# Patient Record
Sex: FEMALE | Race: WHITE | NOT HISPANIC OR LATINO | Employment: OTHER | URBAN - METROPOLITAN AREA
[De-identification: names, ages, dates, MRNs, and addresses within clinical notes are randomized per-mention and may not be internally consistent; named-entity substitution may affect disease eponyms.]

---

## 2019-05-07 DIAGNOSIS — R10.13 DYSPEPSIA: Primary | ICD-10-CM

## 2019-05-07 RX ORDER — OMEPRAZOLE 40 MG/1
40 CAPSULE, DELAYED RELEASE ORAL DAILY
Qty: 90 CAPSULE | Refills: 3 | Status: SHIPPED | OUTPATIENT
Start: 2019-05-07 | End: 2020-07-06

## 2019-07-26 ENCOUNTER — TELEPHONE (OUTPATIENT)
Dept: GASTROENTEROLOGY | Facility: CLINIC | Age: 74
End: 2019-07-26

## 2019-07-26 NOTE — TELEPHONE ENCOUNTER
I contacted [facility], they have refills on file  Patient called and it was too early to fill at that time  They will process Rx now

## 2019-07-26 NOTE — TELEPHONE ENCOUNTER
Patient was here with her  and needs refills of Omeprazole 40 mg   One daily #90  to [facility] [phone]

## 2020-02-07 ENCOUNTER — IOP CHECK (OUTPATIENT)
Dept: URBAN - METROPOLITAN AREA CLINIC 79 | Facility: CLINIC | Age: 75
End: 2020-02-07

## 2020-02-07 DIAGNOSIS — H40.1131: ICD-10-CM

## 2020-02-07 PROCEDURE — 92012 INTRM OPH EXAM EST PATIENT: CPT

## 2020-02-07 PROCEDURE — 92083 EXTENDED VISUAL FIELD XM: CPT

## 2020-02-07 ASSESSMENT — TONOMETRY
OD_IOP_MMHG: 12
OS_IOP_MMHG: 12

## 2020-02-07 ASSESSMENT — VISUAL ACUITY
OS_CC: 20/25+2
OD_CC: 20/30-2

## 2020-06-26 ENCOUNTER — TELEPHONE (OUTPATIENT)
Dept: GASTROENTEROLOGY | Facility: CLINIC | Age: 75
End: 2020-06-26

## 2020-06-29 ENCOUNTER — IOP CHECK (OUTPATIENT)
Dept: URBAN - METROPOLITAN AREA CLINIC 79 | Facility: CLINIC | Age: 75
End: 2020-06-29

## 2020-06-29 DIAGNOSIS — H40.1131: ICD-10-CM

## 2020-06-29 PROCEDURE — 92012 INTRM OPH EXAM EST PATIENT: CPT

## 2020-06-29 PROCEDURE — 92133 CPTRZD OPH DX IMG PST SGM ON: CPT

## 2020-06-29 ASSESSMENT — TONOMETRY
OD_IOP_MMHG: 13
OS_IOP_MMHG: 12

## 2020-06-29 ASSESSMENT — VISUAL ACUITY
OD_PH: 20/30-1
OS_CC: 20/30+2
OD_CC: 20/60-1

## 2020-07-06 DIAGNOSIS — R10.13 DYSPEPSIA: ICD-10-CM

## 2020-07-06 RX ORDER — OMEPRAZOLE 40 MG/1
CAPSULE, DELAYED RELEASE ORAL
Qty: 90 CAPSULE | Refills: 3 | Status: SHIPPED | OUTPATIENT
Start: 2020-07-06 | End: 2021-07-02

## 2020-07-27 ENCOUNTER — TELEPHONE (OUTPATIENT)
Dept: GASTROENTEROLOGY | Facility: CLINIC | Age: 75
End: 2020-07-27

## 2020-07-27 NOTE — TELEPHONE ENCOUNTER
I notified patient that this Rx was filled 7/6/20 to [facility] and receipt confirmed  She is scheduled for follow up in August 2020

## 2020-08-11 ENCOUNTER — CLINICAL SUPPORT (OUTPATIENT)
Dept: GASTROENTEROLOGY | Facility: CLINIC | Age: 75
End: 2020-08-11

## 2020-08-11 VITALS — BODY MASS INDEX: 26.63 KG/M2 | WEIGHT: 156 LBS | HEIGHT: 64 IN

## 2020-08-11 DIAGNOSIS — Z87.19 HISTORY OF BARRETT'S ESOPHAGUS: Primary | ICD-10-CM

## 2020-08-11 RX ORDER — LISINOPRIL 2.5 MG/1
2.5 TABLET ORAL 2 TIMES DAILY
COMMUNITY

## 2020-08-18 ENCOUNTER — HOSPITAL ENCOUNTER (OUTPATIENT)
Dept: GASTROENTEROLOGY | Facility: AMBULATORY SURGERY CENTER | Age: 75
Discharge: HOME | End: 2020-08-18
Payer: MEDICARE

## 2020-08-18 ENCOUNTER — ANESTHESIA EVENT (OUTPATIENT)
Dept: GASTROENTEROLOGY | Facility: AMBULATORY SURGERY CENTER | Age: 75
End: 2020-08-18

## 2020-08-18 ENCOUNTER — ANESTHESIA (OUTPATIENT)
Dept: GASTROENTEROLOGY | Facility: AMBULATORY SURGERY CENTER | Age: 75
End: 2020-08-18

## 2020-08-18 VITALS
HEART RATE: 62 BPM | SYSTOLIC BLOOD PRESSURE: 162 MMHG | RESPIRATION RATE: 23 BRPM | TEMPERATURE: 97.3 F | OXYGEN SATURATION: 98 % | DIASTOLIC BLOOD PRESSURE: 78 MMHG

## 2020-08-18 DIAGNOSIS — K22.70 BARRETT'S ESOPHAGUS WITHOUT DYSPLASIA: ICD-10-CM

## 2020-08-18 PROBLEM — K21.9 GASTROESOPHAGEAL REFLUX DISEASE: Status: ACTIVE | Noted: 2020-08-18

## 2020-08-18 PROBLEM — I10 ESSENTIAL HYPERTENSION: Status: ACTIVE | Noted: 2020-08-18

## 2020-08-18 PROBLEM — C50.919 BREAST CANCER (HCC): Status: ACTIVE | Noted: 2020-08-18

## 2020-08-18 PROCEDURE — 43239 EGD BIOPSY SINGLE/MULTIPLE: CPT | Performed by: INTERNAL MEDICINE

## 2020-08-18 PROCEDURE — 88305 TISSUE EXAM BY PATHOLOGIST: CPT | Performed by: PATHOLOGY

## 2020-08-18 RX ORDER — SODIUM CHLORIDE 9 MG/ML
INJECTION, SOLUTION INTRAVENOUS CONTINUOUS PRN
Status: DISCONTINUED | OUTPATIENT
Start: 2020-08-18 | End: 2020-08-18

## 2020-08-18 RX ORDER — PROPOFOL 10 MG/ML
INJECTION, EMULSION INTRAVENOUS AS NEEDED
Status: DISCONTINUED | OUTPATIENT
Start: 2020-08-18 | End: 2020-08-18

## 2020-08-18 RX ORDER — LIDOCAINE HYDROCHLORIDE 10 MG/ML
INJECTION, SOLUTION EPIDURAL; INFILTRATION; INTRACAUDAL; PERINEURAL AS NEEDED
Status: DISCONTINUED | OUTPATIENT
Start: 2020-08-18 | End: 2020-08-18

## 2020-08-18 RX ORDER — SODIUM CHLORIDE 9 MG/ML
50 INJECTION, SOLUTION INTRAVENOUS ONCE
Status: COMPLETED | OUTPATIENT
Start: 2020-08-18 | End: 2020-08-18

## 2020-08-18 RX ADMIN — SODIUM CHLORIDE 50 ML/HR: 9 INJECTION, SOLUTION INTRAVENOUS at 08:26

## 2020-08-18 RX ADMIN — PROPOFOL 20 MG: 10 INJECTION, EMULSION INTRAVENOUS at 08:56

## 2020-08-18 RX ADMIN — SODIUM CHLORIDE: 9 INJECTION, SOLUTION INTRAVENOUS at 08:46

## 2020-08-18 RX ADMIN — PROPOFOL 20 MG: 10 INJECTION, EMULSION INTRAVENOUS at 08:54

## 2020-08-18 RX ADMIN — PROPOFOL 10 MG: 10 INJECTION, EMULSION INTRAVENOUS at 08:55

## 2020-08-18 RX ADMIN — PROPOFOL 20 MG: 10 INJECTION, EMULSION INTRAVENOUS at 08:52

## 2020-08-18 RX ADMIN — LIDOCAINE HYDROCHLORIDE 40 MG: 10 INJECTION, SOLUTION EPIDURAL; INFILTRATION; INTRACAUDAL; PERINEURAL at 08:49

## 2020-08-18 RX ADMIN — PROPOFOL 30 MG: 10 INJECTION, EMULSION INTRAVENOUS at 08:49

## 2020-08-18 RX ADMIN — PROPOFOL 20 MG: 10 INJECTION, EMULSION INTRAVENOUS at 08:50

## 2020-08-18 RX ADMIN — PROPOFOL 10 MG: 10 INJECTION, EMULSION INTRAVENOUS at 08:53

## 2020-08-18 NOTE — ANESTHESIA PREPROCEDURE EVALUATION
Procedure:  EGD    Relevant Problems   CARDIO   (+) Essential hypertension      GI/HEPATIC   (+) Gastroesophageal reflux disease      GYN   (+) Breast cancer (HCC)        Physical Exam    Airway    Mallampati score: II  TM Distance: >3 FB  Neck ROM: full     Dental   No notable dental hx     Cardiovascular  Rhythm: regular, Rate: normal, Cardiovascular exam normal    Pulmonary  Pulmonary exam normal Breath sounds clear to auscultation,     Other Findings        Anesthesia Plan  ASA Score- 2     Anesthesia Type- IV sedation with anesthesia with ASA Monitors  Additional Monitors:   Airway Plan:           Plan Factors-Exercise tolerance (METS): >4 METS  Chart reviewed  Patient is not a current smoker  Patient instructed to abstain from smoking on day of procedure  Patient did not smoke on day of surgery  Obstructive sleep apnea risk education given perioperatively  Induction- intravenous  Postoperative Plan-     Informed Consent- Anesthetic plan and risks discussed with patient

## 2020-08-18 NOTE — ANESTHESIA POSTPROCEDURE EVALUATION
Post-Op Assessment Note    CV Status:  Stable  Pain Score: 0    Pain management: adequate     Mental Status:  Sleepy   Hydration Status:  Euvolemic   PONV Controlled:  Controlled   Airway Patency:  Patent      Post Op Vitals Reviewed: Yes      Staff: Anesthesiologist         No complications documented      BP      Temp     Pulse     Resp      SpO2

## 2020-08-18 NOTE — H&P
History and Physical -  [facility]  Kassidy Badillo 66 y o  female MRN: [id number]    HPI: Kassidy Badillo is a 66y o  year old female who presents for upper endoscopy  She has history of Hernandez's esophagus and multiple gastric polyps benign    REVIEW OF SYSTEMS: Per the HPI, and otherwise unremarkable  Historical Information   Past Medical History:   Diagnosis Date    Hernandez esophagus     Cancer ([address] )     Breast ca    Colon polyp     GERD (gastroesophageal reflux disease)     Hypertension      Past Surgical History:   Procedure Laterality Date    APPENDECTOMY      BREAST SURGERY      COLONOSCOPY      HERNIA REPAIR      umbilical and inguinal x2    UPPER GASTROINTESTINAL ENDOSCOPY       Social History   Social History     Substance and Sexual Activity   Alcohol Use Yes    Frequency: 2-4 times a month     Social History     Substance and Sexual Activity   Drug Use Not on file     Social History     Tobacco Use   Smoking Status Former Smoker   Smokeless Tobacco Never Used     Family History   Problem Relation Age of Onset    Colon cancer Mother     Colon polyps Neg Hx        Meds/Allergies       Current Outpatient Medications:     lisinopril (ZESTRIL) 2 5 mg tablet    omeprazole (PriLOSEC) 40 MG capsule  No current facility-administered medications for this encounter  Allergies   Allergen Reactions    Adhesive [Medical Tape] Hives    Bactrim [Sulfamethoxazole-Trimethoprim] Hives    Cefadroxil Diarrhea    Flagyl [Metronidazole] Hives       Objective     /73   Pulse 69   Temp (!) 97 3 °F (36 3 °C) (Temporal)   Resp 12   SpO2 97%     PHYSICAL EXAM    Gen: NAD AAOx3  CV: S1S2 RRR no m/r/g  CHEST: Clear b/l no c/r/w  ABD: soft, +BS NT/ND  EXT: no edema    ASSESSMENT/PLAN:  This is a 66y o  year old female here for Hernandez's esophagus and she is stable and optimized for her procedure

## 2020-08-18 NOTE — DISCHARGE INSTRUCTIONS
Hernandez Esophagus   WHAT YOU NEED TO KNOW:   What is Hernandez esophagus? Hernandez esophagus is a condition in which the cells that line your esophagus are damaged  The damage can cause abnormal changes in the cells  These abnormal changes increase your risk of esophageal cancer  What increases my risk for Hernandez esophagus? The exact cause of Hernandez esophagus is not known  The following may increase your risk:  · Long-term gastroesophageal reflux disease (GERD), a condition that causes stomach acid to back up into the esophagus    · Age older than 48    · Family history of GERD, Hernandez esophagus, or esophageal cancer    · Obesity    · Smoking  What are the signs and symptoms of Hernandez esophagus? Signs and symptoms of Hernandez esophagus are usually related to the signs and symptoms of GERD  You may also have any of the following:  · Heartburn    · Difficult or painful swallowing    · Bitter or acid taste in your mouth    · Cough    · Frequent burping or hiccups    · Vomiting blood or having black, tarry bowel movements    · Weight loss without trying  How is Hernandez esophagus diagnosed? · An endoscopy  is a procedure in which a scope is used to see the inside of your esophagus and stomach  A scope is a long, bendable tube with a light on the end of it  A camera may be hooked to the scope  A biopsy may also be done  A biopsy is when your healthcare provider takes tissue samples from your esophagus and sends it to a lab for tests  These tests will show if the cells of your esophagus have dysplasia (abnormal changes in your cells and tissues)  · The grade  of dysplasia is the amount of abnormal change that is present in your esophagus  The grade can range from negative (no dysplasia) to high-grade (a large amount of dysplasia)  There is a higher risk of cancer with high-grade dysplasia  How is Hernandez esophagus treated? Treatment depends on the grade of dysplasia   The goal of treatment is to control your symptoms and prevent esophageal cancer  Your healthcare provider may also suggest that you make changes in the foods you eat and in your lifestyle  You may need any of the following:  · Anti-reflux medicines  help decrease the stomach acid that can irritate your esophagus and stomach  These medicines may include proton pump inhibitors (PPI) and histamine type-2 receptor (H2) blockers  You may also be given medicines to stop vomiting  · Surgery or other procedures  may be done if you have high-grade dysplasia  ¨ Fundoplication  is a surgery that wraps the upper part of your stomach around the esophageal sphincter to strengthen it  The esophageal sphincter is the muscle at the lower end of the esophagus, right above the stomach  This may help to prevent reflux  ¨ Resection or esophagectomy  is surgery to remove a part of or the entire esophagus  ¨ Ablation  is procedure that kills abnormal cells with heat or by freezing them  ¨ Photodynamic therapy  is a procedure that uses a special type of light to kill abnormal cells  It is used in combination with medicines that make the abnormal cells sensitive to light  When should I contact my healthcare provider? · Your symptoms do not improve with treatment  · You have questions or concerns about your condition or care  When should I seek immediate care or call 911? · You have severe chest pain and shortness of breath  · Your bowel movements are black, bloody, or tarry  · Your vomit looks like coffee grounds or has blood in it  CARE AGREEMENT:   You have the right to help plan your care  Learn about your health condition and how it may be treated  Discuss treatment options with your caregivers to decide what care you want to receive  You always have the right to refuse treatment  The above information is an  only  It is not intended as medical advice for individual conditions or treatments   Talk to your doctor, nurse or pharmacist before following any medical regimen to see if it is safe and effective for you  © 2017 [employer] Information is for End User's use only and may not be sold, redistributed or otherwise used for commercial purposes  All illustrations and images included in CareNotes® are the copyrighted property of [employer]  or Kahlil Aguirre  Hiatal Hernia   WHAT YOU NEED TO KNOW:   What is a hiatal hernia? A hiatal hernia is a condition that causes part of your stomach to bulge through the hiatus (small opening) in your diaphragm  The part of the stomach may move up and down, or it may get trapped above the diaphragm  What increases my risk for a hiatal hernia? The exact cause of a hiatal hernia is not known  You may have been born with a large hiatus  The following may increase your risk of a hiatal hernia:  · Obesity    · Older age    · Medical conditions such as diverticulosis or esophagitis    · Previous surgery of the esophagus or stomach or trauma such as from a motor vehicle accident  What are the types of hiatal hernia? · Type I (sliding hiatal hernia): A portion of the stomach slides in and out of the hiatus  This type is the most common and usually causes gastroesophageal reflux disease (GERD)  GERD occurs when the esophageal sphincter does not close properly and causes acid reflux  The esophageal sphincter is the lower muscle of the esophagus  · Type II (paraesophageal hiatal hernia):  Type II hiatal hernia forms when a part of the stomach squeezes through the hiatus and lies next to the esophagus  · Type III (combined):  Type III hiatal hernia is a combination of a sliding and a paraesophageal hiatal hernia  · Type IV (complex paraesophageal hiatal hernia): The whole stomach, the small and large bowels, spleen, pancreas, or liver is pushed up into the chest   What are the signs and symptoms of a hiatal hernia? The most common symptom is heartburn   This usually occurs after meals and spreads to your neck, jaw, or shoulder  You may have no signs or symptoms, or you may have any of the following:  · Abdominal pain, especially in the area just above your navel    · Bitter or acid taste in your mouth    · Trouble swallowing    · Coughing or hoarseness    · Chest pain or shortness of breath that occurs after eating    · Frequent burping or hiccups    · Uncomfortable feeling of fullness after eating  How is a hiatal hernia diagnosed? · An upper GI series test  includes x-rays of your esophagus, stomach, and your small intestines  It is also called a barium swallow test  You will be given barium (a chalky liquid) to drink before the pictures are taken  This liquid helps your stomach and intestines show up better on the x-rays  An upper GI series can show if you have an ulcer, a blocked intestine, or other problems  · An endoscopy  uses a scope to see the inside of your digestive tract  A scope is a long, bendable tube with a light on the end of it  A camera may be hooked to the scope to take pictures  How is a hiatal hernia treated? Treatment depends on the type of hiatal hernia you have and on your symptoms  You may not need any treatment  You may need any of the following:  · Medicines  may be given to relieve heartburn symptoms  These medicines help to decrease or block stomach acid  You may also be given medicines that help to tighten the esophageal sphincter  · Surgery  may be done when medicines cannot control your symptoms, or other problems are present  Your healthcare provider may also suggest surgery depending on the type of hernia you have  Your healthcare provider can put your stomach back into its normal location  He may make the hiatus (hole) smaller and anchor your stomach in your abdomen  Fundoplication is a surgery that wraps the upper part of the stomach around the esophageal sphincter to strengthen it  How can I manage symptoms?   The following nutrition and lifestyle changes may be recommended to relieve symptoms of heartburn  · Avoid foods that make your symptoms worse  These may include spicy foods, fruit juices, alcohol, caffeine, chocolate, and mint  · Eat several small meals during the day  Small meals give your stomach less food to digest     · Avoid lying down and bending forward after you eat  Do not eat meals 2 to 3 hours before bedtime  This decreases your risk for reflux  · Maintain a healthy weight  If you are overweight, weight loss may help relieve your symptoms  · Sleep with your head elevated  at least 6 inches  · Do not smoke  Smoking can increase your symptoms of heartburn  When should I seek immediate care? · You have severe abdominal pain  · You try to vomit but nothing comes out (retching)  · You have severe chest pain and sudden trouble breathing  · Your bowel movements are black or bloody  · Your vomit looks like coffee grounds or has blood in it  When should I contact my healthcare provider? · Your symptoms are getting worse  · You have nausea, and you are vomiting  · You are losing weight without trying  · You have questions or concerns about your condition or care  CARE AGREEMENT:   You have the right to help plan your care  Learn about your health condition and how it may be treated  Discuss treatment options with your caregivers to decide what care you want to receive  You always have the right to refuse treatment  The above information is an  only  It is not intended as medical advice for individual conditions or treatments  Talk to your doctor, nurse or pharmacist before following any medical regimen to see if it is safe and effective for you  © 2017 [address] Information is for End User's use only and may not be sold, redistributed or otherwise used for commercial purposes   All illustrations and images included in CareNotes® are the copyrighted property of Amrik BENAVIDES  or Kahlil Aguirre

## 2020-08-27 NOTE — RESULT ENCOUNTER NOTE
I called the patient and reviewed path - lugo's no dysplasia -  Gastric polyps are benign - no h pylori- recall egd in 3 years - office visit in 1 year - copy Dr Jenn Smith for path

## 2020-10-19 ENCOUNTER — ESTABLISHED COMPREHENSIVE EXAM (OUTPATIENT)
Dept: URBAN - METROPOLITAN AREA CLINIC 79 | Facility: CLINIC | Age: 75
End: 2020-10-19

## 2020-10-19 VITALS — HEIGHT: 55 IN

## 2020-10-19 DIAGNOSIS — H40.1131: ICD-10-CM

## 2020-10-19 PROCEDURE — 92014 COMPRE OPH EXAM EST PT 1/>: CPT

## 2020-10-19 PROCEDURE — 92250 FUNDUS PHOTOGRAPHY W/I&R: CPT

## 2020-10-19 ASSESSMENT — VISUAL ACUITY
OS_CC: 20/25-1
OD_CC: 20/30
OS_CC: J1
OD_SC: 20/50
OD_CC: J1
OS_SC: 20/50-1

## 2020-10-19 ASSESSMENT — TONOMETRY
OD_IOP_MMHG: 13
OS_IOP_MMHG: 13

## 2021-03-05 ENCOUNTER — IOP CHECK (OUTPATIENT)
Dept: URBAN - METROPOLITAN AREA CLINIC 79 | Facility: CLINIC | Age: 76
End: 2021-03-05

## 2021-03-05 VITALS — HEIGHT: 55 IN

## 2021-03-05 DIAGNOSIS — H25.13: ICD-10-CM

## 2021-03-05 DIAGNOSIS — H40.1131: ICD-10-CM

## 2021-03-05 PROCEDURE — 92012 INTRM OPH EXAM EST PATIENT: CPT

## 2021-03-05 PROCEDURE — 92083 EXTENDED VISUAL FIELD XM: CPT

## 2021-03-05 PROCEDURE — 92134 CPTRZ OPH DX IMG PST SGM RTA: CPT | Mod: NC

## 2021-03-05 ASSESSMENT — VISUAL ACUITY
OD_CC: 20/25
OS_CC: 20/25

## 2021-03-05 ASSESSMENT — TONOMETRY
OD_IOP_MMHG: 10
OS_IOP_MMHG: 12

## 2021-04-05 ENCOUNTER — FOLLOW UP (OUTPATIENT)
Dept: URBAN - METROPOLITAN AREA CLINIC 79 | Facility: CLINIC | Age: 76
End: 2021-04-05

## 2021-04-05 VITALS — HEIGHT: 55 IN

## 2021-04-05 DIAGNOSIS — H40.1131: ICD-10-CM

## 2021-04-05 DIAGNOSIS — H25.13: ICD-10-CM

## 2021-04-05 PROCEDURE — 92012 INTRM OPH EXAM EST PATIENT: CPT

## 2021-04-05 PROCEDURE — 92136 OPHTHALMIC BIOMETRY: CPT

## 2021-04-05 ASSESSMENT — TONOMETRY
OS_IOP_MMHG: 13
OD_IOP_MMHG: 13

## 2021-04-05 ASSESSMENT — VISUAL ACUITY
OD_CC: 20/25
OS_CC: 20/20-2

## 2021-05-27 ENCOUNTER — SURGERY/PROCEDURE (OUTPATIENT)
Dept: URBAN - METROPOLITAN AREA SURGICAL CENTER 17 | Facility: SURGICAL CENTER | Age: 76
End: 2021-05-27

## 2021-05-27 DIAGNOSIS — H25.11: ICD-10-CM

## 2021-05-27 PROCEDURE — 66984 XCAPSL CTRC RMVL W/O ECP: CPT

## 2021-05-28 ENCOUNTER — 1 DAY POST-OP (OUTPATIENT)
Dept: URBAN - METROPOLITAN AREA CLINIC 79 | Facility: CLINIC | Age: 76
End: 2021-05-28

## 2021-05-28 VITALS — HEIGHT: 55 IN

## 2021-05-28 DIAGNOSIS — Z96.1: ICD-10-CM

## 2021-05-28 DIAGNOSIS — H25.12: ICD-10-CM

## 2021-05-28 PROCEDURE — 99024 POSTOP FOLLOW-UP VISIT: CPT

## 2021-05-28 ASSESSMENT — VISUAL ACUITY: OD_SC: 20/25-2

## 2021-05-28 ASSESSMENT — TONOMETRY: OD_IOP_MMHG: 15

## 2021-06-03 ENCOUNTER — POST-OP CHECK (OUTPATIENT)
Dept: URBAN - METROPOLITAN AREA CLINIC 79 | Facility: CLINIC | Age: 76
End: 2021-06-03

## 2021-06-03 VITALS — HEIGHT: 55 IN

## 2021-06-03 DIAGNOSIS — Z96.1: ICD-10-CM

## 2021-06-03 PROCEDURE — 99024 POSTOP FOLLOW-UP VISIT: CPT

## 2021-06-03 ASSESSMENT — VISUAL ACUITY
OS_CC: 20/25
OD_SC: 20/20

## 2021-06-03 ASSESSMENT — TONOMETRY
OD_IOP_MMHG: 17
OS_IOP_MMHG: 16

## 2021-06-04 ENCOUNTER — FOLLOW UP (OUTPATIENT)
Dept: URBAN - METROPOLITAN AREA CLINIC 79 | Facility: CLINIC | Age: 76
End: 2021-06-04

## 2021-06-04 DIAGNOSIS — Z96.1: ICD-10-CM

## 2021-06-04 PROCEDURE — 99024 POSTOP FOLLOW-UP VISIT: CPT

## 2021-06-04 ASSESSMENT — VISUAL ACUITY
OS_CC: 20/30-1
OD_SC: 20/25

## 2021-06-04 ASSESSMENT — TONOMETRY: OD_IOP_MMHG: 11

## 2021-06-07 ENCOUNTER — FOLLOW UP (OUTPATIENT)
Dept: URBAN - METROPOLITAN AREA CLINIC 79 | Facility: CLINIC | Age: 76
End: 2021-06-07

## 2021-06-07 VITALS — HEIGHT: 55 IN

## 2021-06-07 DIAGNOSIS — Z96.1: ICD-10-CM

## 2021-06-07 PROCEDURE — 99024 POSTOP FOLLOW-UP VISIT: CPT

## 2021-06-07 ASSESSMENT — VISUAL ACUITY
OD_SC: 20/20
OS_CC: 20/30+2

## 2021-06-07 ASSESSMENT — TONOMETRY
OD_IOP_MMHG: 12
OS_IOP_MMHG: 12

## 2021-06-14 ENCOUNTER — FOLLOW UP (OUTPATIENT)
Dept: URBAN - METROPOLITAN AREA CLINIC 79 | Facility: CLINIC | Age: 76
End: 2021-06-14

## 2021-06-14 DIAGNOSIS — Z96.1: ICD-10-CM

## 2021-06-14 DIAGNOSIS — H25.12: ICD-10-CM

## 2021-06-14 PROCEDURE — 92012 INTRM OPH EXAM EST PATIENT: CPT | Mod: 24

## 2021-06-14 PROCEDURE — 92136 OPHTHALMIC BIOMETRY: CPT | Mod: 26,LT

## 2021-06-14 ASSESSMENT — VISUAL ACUITY
OD_SC: 20/20
OS_CC: 20/25-2

## 2021-06-14 ASSESSMENT — TONOMETRY
OS_IOP_MMHG: 12
OD_IOP_MMHG: 12

## 2021-06-24 ENCOUNTER — SURGERY/PROCEDURE (OUTPATIENT)
Dept: URBAN - METROPOLITAN AREA SURGICAL CENTER 17 | Facility: SURGICAL CENTER | Age: 76
End: 2021-06-24

## 2021-06-24 DIAGNOSIS — H25.12: ICD-10-CM

## 2021-06-24 PROCEDURE — 66984 XCAPSL CTRC RMVL W/O ECP: CPT

## 2021-06-25 ENCOUNTER — 1 DAY POST-OP (OUTPATIENT)
Dept: URBAN - METROPOLITAN AREA CLINIC 79 | Facility: CLINIC | Age: 76
End: 2021-06-25

## 2021-06-25 DIAGNOSIS — Z96.1: ICD-10-CM

## 2021-06-25 PROCEDURE — 99024 POSTOP FOLLOW-UP VISIT: CPT

## 2021-06-25 ASSESSMENT — VISUAL ACUITY
OD_SC: 20/20
OS_SC: 20/20-1

## 2021-06-25 ASSESSMENT — TONOMETRY: OS_IOP_MMHG: 12

## 2021-07-02 ENCOUNTER — 1 WEEK POST-OP (OUTPATIENT)
Dept: URBAN - METROPOLITAN AREA CLINIC 79 | Facility: CLINIC | Age: 76
End: 2021-07-02

## 2021-07-02 DIAGNOSIS — Z96.1: ICD-10-CM

## 2021-07-02 PROCEDURE — 99024 POSTOP FOLLOW-UP VISIT: CPT

## 2021-07-02 ASSESSMENT — TONOMETRY: OS_IOP_MMHG: 10

## 2021-07-02 ASSESSMENT — VISUAL ACUITY: OS_SC: 20/25+2

## 2021-07-26 ENCOUNTER — POST-OP CHECK (OUTPATIENT)
Dept: URBAN - METROPOLITAN AREA CLINIC 79 | Facility: CLINIC | Age: 76
End: 2021-07-26

## 2021-07-26 DIAGNOSIS — Z96.1: ICD-10-CM

## 2021-07-26 DIAGNOSIS — H52.222: ICD-10-CM

## 2021-07-26 PROCEDURE — 92015 DETERMINE REFRACTIVE STATE: CPT | Mod: GY

## 2021-07-26 PROCEDURE — 99024 POSTOP FOLLOW-UP VISIT: CPT

## 2021-07-26 ASSESSMENT — TONOMETRY
OS_IOP_MMHG: 11
OD_IOP_MMHG: 10

## 2021-07-26 ASSESSMENT — VISUAL ACUITY
OS_SC: 20/30-2
OD_SC: 20/20

## 2021-09-10 ENCOUNTER — TELEPHONE (OUTPATIENT)
Dept: GASTROENTEROLOGY | Facility: CLINIC | Age: 76
End: 2021-09-10

## 2021-09-10 DIAGNOSIS — R19.7 DIARRHEA, UNSPECIFIED TYPE: Primary | ICD-10-CM

## 2021-09-10 NOTE — TELEPHONE ENCOUNTER
Pt left  mssg stating she has an issue/needs to spk w/ a nurse ASAP to know what to do  CB# [phone]
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
## 2021-09-10 NOTE — TELEPHONE ENCOUNTER
Called pt back, she has been having issues with diarrhea for approximately 1 month  She started with some diarrhea then was admitted to [facility] 8/20-8/25 with Covid  (Presently still on home oxygen)  Her diarrhea continues 1-2 times daily every morning  Today she was incontinent  She has borborygmus but denies pain, nausea, vomiting, fever or rectal bleeding  Her appetite is good  She has taken Imodium twice and has been effective but afraid to take too often  Yesterday she started on a brat diet  She was not on any antibiotics prior to the start of the diarrhea and no recent travel  She had an EGD last 8/20 with a 3 year recall  Her last ov was 6/2018 (scanned to [facility] and placed on your desk for review)  She also had a colonoscopy done 8/2018)  (Also being placed on your desk)  Pharmacy confirmed and if needs stool studies she uses [facility] in [facility] ([phone])  Pt's number [phone] and she is requesting a call back today due to the incontinence

## 2021-09-10 NOTE — TELEPHONE ENCOUNTER
Spoke with patient - she has been experiencing liquid stools for at least 1 month with 2 bowel movements usually 1st thing in a m  and usually occurs after eating breakfast  Usually does not have diarrhea throughout the day and is able to eat lunch and dinner without difficulty  No nocturnal bowel movement, no melena  Or hematochezia   she has been able to eat and drink normally and denies recent weight loss  She was hospitalized in August with COVID and reports loose stools while in the hospital but no stool specimens sent  She is unsure if she received antibiotics while in the hospital    denies abdominal pain or cramping, no nausea or vomiting  Recommend checking stool samples for C diff, enteric bacterial panel and ova and parasites    Will follow up with patient next week once results obtained

## 2021-09-11 ENCOUNTER — APPOINTMENT (OUTPATIENT)
Dept: LAB | Facility: HOSPITAL | Age: 76
End: 2021-09-11
Payer: MEDICARE

## 2021-09-11 PROCEDURE — 87177 OVA AND PARASITES SMEARS: CPT

## 2021-09-11 PROCEDURE — 87505 NFCT AGENT DETECTION GI: CPT

## 2021-09-11 PROCEDURE — 87209 SMEAR COMPLEX STAIN: CPT

## 2021-09-12 ENCOUNTER — APPOINTMENT (OUTPATIENT)
Dept: LAB | Facility: HOSPITAL | Age: 76
End: 2021-09-12
Payer: MEDICARE

## 2021-09-12 PROCEDURE — 87493 C DIFF AMPLIFIED PROBE: CPT

## 2021-09-13 LAB
C DIFF TOX GENS STL QL NAA+PROBE: NEGATIVE
CAMPYLOBACTER DNA SPEC NAA+PROBE: NORMAL
SALMONELLA DNA SPEC QL NAA+PROBE: NORMAL
SHIGA TOXIN STX GENE SPEC NAA+PROBE: NORMAL
SHIGELLA DNA SPEC QL NAA+PROBE: NORMAL

## 2021-09-15 ENCOUNTER — TELEPHONE (OUTPATIENT)
Dept: GASTROENTEROLOGY | Facility: CLINIC | Age: 76
End: 2021-09-15

## 2021-09-15 NOTE — TELEPHONE ENCOUNTER
Called pt and reviewed stool cultures which were negative  She continues to experience liquid stools daily for 1 month - afraid to leave the house   Scheduled for OV with Dr Gary Gipson tomorrow 9/16 for further evaluation

## 2021-09-16 ENCOUNTER — OFFICE VISIT (OUTPATIENT)
Dept: GASTROENTEROLOGY | Facility: CLINIC | Age: 76
End: 2021-09-16
Payer: MEDICARE

## 2021-09-16 VITALS
SYSTOLIC BLOOD PRESSURE: 124 MMHG | DIASTOLIC BLOOD PRESSURE: 82 MMHG | HEIGHT: 64 IN | WEIGHT: 151 LBS | BODY MASS INDEX: 25.78 KG/M2

## 2021-09-16 DIAGNOSIS — K59.1 FUNCTIONAL DIARRHEA: Primary | ICD-10-CM

## 2021-09-16 DIAGNOSIS — R14.0 BLOATING: ICD-10-CM

## 2021-09-16 LAB — O+P STL CONC: NORMAL

## 2021-09-16 PROCEDURE — 99214 OFFICE O/P EST MOD 30 MIN: CPT | Performed by: INTERNAL MEDICINE
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
## 2021-09-20 ENCOUNTER — OFFICE VISIT (OUTPATIENT)
Dept: GASTROENTEROLOGY | Facility: CLINIC | Age: 76
End: 2021-09-20
Payer: MEDICARE

## 2021-09-20 DIAGNOSIS — K59.1 FUNCTIONAL DIARRHEA: Primary | ICD-10-CM

## 2021-09-20 PROCEDURE — 91065 BREATH HYDROGEN/METHANE TEST: CPT | Performed by: INTERNAL MEDICINE

## 2021-09-24 NOTE — PROGRESS NOTES
[facility]       Bacterial Overgrowth Analytical Record    Tyrel Nunez 64 y o  female MRN: [id number]      Date of Test: 9/20/20    Substrate Given: Lactulose    Ordering Provider: Dr Tashi Rosales Assistant: Kaden Ibanez    Symptoms: diarrhea    The patient presents for bacterial overgrowth testing  Patient fasted overnight  Baseline readings obtained  substrate was administered, and the patient drink without difficulty  Breath test performed every 20 min for a total of 3 hr    Sample Clock Time ppmH2 ppmCH4 Co2% Scotty   Baseline 09:30am   4 2 3 1 1 77   #1  20 minutes 09:57am 2 2 2 8 1 96   #2  40 minutes 10:17am 6 3 3 7 1 48   #3  60 minutes 10:38am 38 5 3 3 1 66   #4  80 minutes 10:58am 49 5 4 0 1 37   #5  100 minutes 11:18am 79 6 3 6 1 52   #6  120 minutes 11:38am 113 8 3 3 1 66   #7  140 minutes 11:58am 126 9 3 0 1 83   #8  160 minutes 12:19pm 140 8 27 2 03   #9  180 minutes 12:39pm 145 12 28 1 96       Physician interpretation:  Positive breath test for small intestinal bacterial overgrowth  Discussed with patient  Symptoms are not as severe  Will defer antibiotics until she sees her pulmonologist October 6th  She will have the office note forwarded to me  If SIBO symptoms worsen in the interim she will contact us for an Augmentin script

## 2021-10-01 DIAGNOSIS — R10.13 DYSPEPSIA: ICD-10-CM

## 2021-10-01 RX ORDER — OMEPRAZOLE 40 MG/1
CAPSULE, DELAYED RELEASE ORAL
Qty: 90 CAPSULE | Refills: 3 | Status: SHIPPED | OUTPATIENT
Start: 2021-10-01

## 2021-11-15 ENCOUNTER — IOP CHECK (OUTPATIENT)
Dept: URBAN - METROPOLITAN AREA CLINIC 79 | Facility: CLINIC | Age: 76
End: 2021-11-15

## 2021-11-15 DIAGNOSIS — H40.1131: ICD-10-CM

## 2021-11-15 DIAGNOSIS — Z96.1: ICD-10-CM

## 2021-11-15 DIAGNOSIS — H26.491: ICD-10-CM

## 2021-11-15 PROCEDURE — 92012 INTRM OPH EXAM EST PATIENT: CPT

## 2021-11-15 PROCEDURE — 92133 CPTRZD OPH DX IMG PST SGM ON: CPT

## 2021-11-15 PROCEDURE — 92134 CPTRZ OPH DX IMG PST SGM RTA: CPT

## 2021-11-15 ASSESSMENT — TONOMETRY
OD_IOP_MMHG: 8
OD_IOP_MMHG: 12
OS_IOP_MMHG: 9
OS_IOP_MMHG: 10

## 2021-11-15 ASSESSMENT — VISUAL ACUITY
OS_CC: 20/20-1
OD_CC: 20/20

## 2021-12-13 ENCOUNTER — PROCEDURE ONLY (OUTPATIENT)
Dept: URBAN - METROPOLITAN AREA CLINIC 79 | Facility: CLINIC | Age: 76
End: 2021-12-13

## 2021-12-13 DIAGNOSIS — H26.491: ICD-10-CM

## 2021-12-13 PROCEDURE — 66821 AFTER CATARACT LASER SURGERY: CPT

## 2021-12-13 ASSESSMENT — VISUAL ACUITY: OD_CC: 20/20-2

## 2021-12-13 ASSESSMENT — TONOMETRY: OD_IOP_MMHG: 10

## 2021-12-27 ENCOUNTER — POST-OP CHECK (OUTPATIENT)
Dept: URBAN - METROPOLITAN AREA CLINIC 79 | Facility: CLINIC | Age: 76
End: 2021-12-27

## 2021-12-27 DIAGNOSIS — Z96.1: ICD-10-CM

## 2021-12-27 DIAGNOSIS — H40.1131: ICD-10-CM

## 2021-12-27 PROCEDURE — 99024 POSTOP FOLLOW-UP VISIT: CPT

## 2021-12-27 PROCEDURE — 92250 FUNDUS PHOTOGRAPHY W/I&R: CPT

## 2021-12-27 ASSESSMENT — VISUAL ACUITY
OD_CC: 20/20
OS_CC: 20/20

## 2021-12-27 ASSESSMENT — TONOMETRY
OD_IOP_MMHG: 10
OS_IOP_MMHG: 10

## 2022-04-04 ENCOUNTER — IOP CHECK (OUTPATIENT)
Dept: URBAN - METROPOLITAN AREA CLINIC 79 | Facility: CLINIC | Age: 77
End: 2022-04-04

## 2022-04-04 DIAGNOSIS — H40.1131: ICD-10-CM

## 2022-04-04 PROCEDURE — 92012 INTRM OPH EXAM EST PATIENT: CPT

## 2022-04-04 ASSESSMENT — TONOMETRY
OD_IOP_MMHG: 11
OS_IOP_MMHG: 11

## 2022-04-04 ASSESSMENT — VISUAL ACUITY
OD_CC: 20/25-2
OS_CC: 20/25-1

## 2022-08-08 ENCOUNTER — ESTABLISHED COMPREHENSIVE EXAM (OUTPATIENT)
Dept: URBAN - METROPOLITAN AREA CLINIC 79 | Facility: CLINIC | Age: 77
End: 2022-08-08

## 2022-08-08 DIAGNOSIS — Z96.1: ICD-10-CM

## 2022-08-08 DIAGNOSIS — H31.103: ICD-10-CM

## 2022-08-08 DIAGNOSIS — I10: ICD-10-CM

## 2022-08-08 DIAGNOSIS — H35.372: ICD-10-CM

## 2022-08-08 DIAGNOSIS — H53.19: ICD-10-CM

## 2022-08-08 DIAGNOSIS — H40.1131: ICD-10-CM

## 2022-08-08 PROCEDURE — 92014 COMPRE OPH EXAM EST PT 1/>: CPT

## 2022-08-08 PROCEDURE — 92250 FUNDUS PHOTOGRAPHY W/I&R: CPT

## 2022-08-08 ASSESSMENT — VISUAL ACUITY
OD_CC: 20/30
OD_SC: 20/20-1
OS_CC: 20/30
OS_SC: 20/25-2
OD_CC: 20/20-1
OS_CC: 20/20-2

## 2022-08-08 ASSESSMENT — TONOMETRY
OS_IOP_MMHG: 10
OD_IOP_MMHG: 09

## 2022-10-05 DIAGNOSIS — R10.13 DYSPEPSIA: ICD-10-CM

## 2022-10-05 RX ORDER — OMEPRAZOLE 40 MG/1
CAPSULE, DELAYED RELEASE ORAL
Qty: 90 CAPSULE | Refills: 3 | Status: SHIPPED | OUTPATIENT
Start: 2022-10-05

## 2022-12-05 ENCOUNTER — IOP CHECK (OUTPATIENT)
Dept: URBAN - METROPOLITAN AREA CLINIC 79 | Facility: CLINIC | Age: 77
End: 2022-12-05

## 2022-12-05 DIAGNOSIS — H40.1131: ICD-10-CM

## 2022-12-05 PROCEDURE — 92083 EXTENDED VISUAL FIELD XM: CPT

## 2022-12-05 PROCEDURE — 92012 INTRM OPH EXAM EST PATIENT: CPT

## 2022-12-05 ASSESSMENT — TONOMETRY
OS_IOP_MMHG: 10
OD_IOP_MMHG: 10

## 2022-12-05 ASSESSMENT — VISUAL ACUITY
OD_CC: 20/20-1
OS_CC: 20/20-1

## 2023-01-12 ENCOUNTER — OFFICE VISIT (OUTPATIENT)
Dept: GASTROENTEROLOGY | Facility: CLINIC | Age: 78
End: 2023-01-12

## 2023-01-12 VITALS
HEIGHT: 65 IN | WEIGHT: 166.2 LBS | DIASTOLIC BLOOD PRESSURE: 65 MMHG | SYSTOLIC BLOOD PRESSURE: 132 MMHG | BODY MASS INDEX: 27.69 KG/M2

## 2023-01-12 DIAGNOSIS — K31.7 GASTRIC POLYPS: ICD-10-CM

## 2023-01-12 DIAGNOSIS — K21.9 GASTROESOPHAGEAL REFLUX DISEASE WITHOUT ESOPHAGITIS: ICD-10-CM

## 2023-01-12 DIAGNOSIS — R19.7 DIARRHEA, UNSPECIFIED TYPE: ICD-10-CM

## 2023-01-12 DIAGNOSIS — Z86.010 PERSONAL HISTORY OF COLONIC POLYPS: ICD-10-CM

## 2023-01-12 DIAGNOSIS — K22.70 BARRETT'S ESOPHAGUS WITHOUT DYSPLASIA: Primary | ICD-10-CM

## 2023-01-12 NOTE — PROGRESS NOTES
[facility] - Outpatient Follow-up Note  Carola Pinedo 76 y o  female MRN: [id number]  Encounter: [id number]    ASSESSMENT AND PLAN:      1  Hernandez's esophagus without dysplasia  --- Patient with a previous history of Hernandez's esophagus  No dysplasia  Last endoscopy 2020  Should be due again this summer --2023  -Continue omeprazole 40 mg daily  Patient seems to have good results with medication    2  Gastroesophageal reflux disease without esophagitis  --Patient without esophagitis  Will have occasional breakthrough symptoms  No dysphagia  Only gets heartburn when she deviates from routine diet    3  Diarrhea, unspecified type  --Intermittent but not an issue presently    4  Personal history of colonic polyps  --Patient with a family history of colon cancer  Mother was at an advanced age either late 76s or early [de-identified]  The  has a personal history of polyps  Did have a serrated adenoma in 2018  Patient is well enough now to proceed with colonoscopy  We will do colonoscopy at same time as EGD summer 2023    5  Gastric polyps  --Had hyperplastic polyps noted on last exam   Size of polyps were 5 to 9 mm  - recheck at time of EGD --  Possible polypectomy if lesions over 10 mm      Followup Appointment: 1 year    ______________________________________________________________________    Chief Complaint   Patient presents with   • GERD     HPI: Patient comes to the office for routine follow-up for her GERD  Overall she does well unless she deviates from her diet on special occasions  This note trouble with swallowing  Patient is maintained on omeprazole 40 mg daily and she has done well with this medication  Patient's last upper endoscopy was in the summer 2020  Note was made of Hernandez's esophagus with no dysplasia  Patient had incidental gastric polyps which range in size from 5 to 9 mm but were all hyperplastic on biopsy    Patient without any significant upper GI symptoms presently  On review of records patient does have a history of colon polyps  Mother had colon cancer in her late 76s or early [de-identified]  Last examination patient did have a right-sided serrated adenoma  Most recent examination was 2018  Should be due again for exam in 2023  Patient remains physically active and feels well  She is a breast cancer survivor and gets a mammogram every year  She had been walking quite a bit but has cut back a little bit because of problems with some low back pain  She reports she has had recent lab work without any significant abnormal results  Not available for review at this time-is not exactly sure what lab performed the exams  Historical Information   Past Medical History:   Diagnosis Date   • Hernandez esophagus    • Cancer ([address] )     Breast ca   • Colon polyp    • GERD (gastroesophageal reflux disease)    • Hypertension      Past Surgical History:   Procedure Laterality Date   • APPENDECTOMY     • BREAST SURGERY     • COLONOSCOPY  08/2018   • HERNIA REPAIR      umbilical and inguinal x2   • UPPER GASTROINTESTINAL ENDOSCOPY       Social History     Substance and Sexual Activity   Alcohol Use Yes     Social History     Substance and Sexual Activity   Drug Use Never     Social History     Tobacco Use   Smoking Status Former   Smokeless Tobacco Never     Family History   Problem Relation Age of Onset   • Colon cancer Mother    • Colon polyps Neg Hx          Current Outpatient Medications:   •  lisinopril (ZESTRIL) 2 5 mg tablet  •  omeprazole (PriLOSEC) 40 MG capsule  Allergies   Allergen Reactions   • Adhesive [Medical Tape] Hives   • Bactrim [Sulfamethoxazole-Trimethoprim] Hives   • Cefadroxil Diarrhea   • Flagyl [Metronidazole] Hives     Reviewed medications and allergies and updated as indicated    PHYSICAL EXAM:    Blood pressure 132/65, height 5' 4 5" (1 638 m), weight 75 4 kg (166 lb 3 2 oz)  Body mass index is 28 09 kg/m²    General Appearance: NAD, cooperative, alert  Eyes: Anicteric, conjunctive a pink  ENT:  Normocephalic, atraumatic, normal mucosa  Neck:  Supple, symmetrical, trachea midline  Resp:  Clear to auscultation bilaterally; no rales, rhonchi or wheezing; respirations unlabored   CV:  S1 S2, Regular rate and rhythm; no murmur, rub, or gallop  GI:  Soft, non-tender, non-distended; normal bowel sounds; no masses, no organomegaly   Rectal: Deferred  Musculoskeletal: No cyanosis, clubbing or edema  Normal ROM    Skin:  No jaundice, rashes, or lesions   Heme/Lymph: No palpable cervical lymphadenopathy  Psych: Normal affect, good eye contact  Neuro: No gross deficits, AAOx3

## 2023-01-12 NOTE — PATIENT INSTRUCTIONS
[facility] - Outpatient Follow-up Note  Marcus Payton 76 y o  female MRN: [id number]  Encounter: [id number]    ASSESSMENT AND PLAN:      1  Hernandez's esophagus without dysplasia  --- Patient with a previous history of Hernandez's esophagus  No dysplasia  Last endoscopy 2020  Should be due again this summer --2023  -Continue omeprazole 40 mg daily  Patient seems to have good results with medication    2  Gastroesophageal reflux disease without esophagitis  --Patient without esophagitis  Will have occasional breakthrough symptoms  No dysphagia  Only gets heartburn when she deviates from routine diet    3  Diarrhea, unspecified type  --Intermittent but not an issue presently    4  Personal history of colonic polyps  --Patient with a family history of colon cancer  Mother was at an advanced age either late 76s or early [de-identified]  The  has a personal history of polyps  Did have a serrated adenoma in 2018  Patient is well enough now to proceed with colonoscopy  We will do colonoscopy at same time as EGD summer 2023    5   Gastric polyps  --Had hyperplastic polyps noted on last exam   Size of polyps were 5 to 9 mm  - recheck at time of EGD --  Possible polypectomy if lesions over 10 mm    Followup Appointment: 1 year

## 2023-01-12 NOTE — LETTER
January 13, 2023     Erick Schaefer, [address] [address]  [id number] [place] [address] [address] [address]    Patient: Jose Cruz Boyd   YOB: 1945   Date of Visit: 1/12/2023       Dear Dr Weiss Rise:    Thank you for referring Jose Cruz Boyd to me for evaluation  Below are my notes for this consultation  If you have questions, please do not hesitate to call me  I look forward to following your patient along with you  Sincerely,        Raoul Serrato MD        CC: No Recipients  Raoul Serrato MD  1/13/2023  7:49 AM  Sign when Signing Visit  [address] [facility] - Outpatient Follow-up Note  Jose Cruz Boyd 76 y o  female MRN: [id number]  Encounter: [id number]    ASSESSMENT AND PLAN:      1  Hernandez's esophagus without dysplasia  --- Patient with a previous history of Hernandez's esophagus  No dysplasia  Last endoscopy 2020  Should be due again this summer --2023  -Continue omeprazole 40 mg daily  Patient seems to have good results with medication    2  Gastroesophageal reflux disease without esophagitis  --Patient without esophagitis  Will have occasional breakthrough symptoms  No dysphagia  Only gets heartburn when she deviates from routine diet    3  Diarrhea, unspecified type  --Intermittent but not an issue presently    4  Personal history of colonic polyps  --Patient with a family history of colon cancer  Mother was at an advanced age either late 76s or early [de-identified]  The  has a personal history of polyps  Did have a serrated adenoma in 2018  Patient is well enough now to proceed with colonoscopy  We will do colonoscopy at same time as EGD summer 2023    5   Gastric polyps  --Had hyperplastic polyps noted on last exam   Size of polyps were 5 to 9 mm  - recheck at time of EGD --  Possible polypectomy if lesions over 10 mm      Followup Appointment: 1 year    ______________________________________________________________________    Chief Complaint   Patient presents with   • GERD HPI: Patient comes to the office for routine follow-up for her GERD  Overall she does well unless she deviates from her diet on special occasions  This note trouble with swallowing  Patient is maintained on omeprazole 40 mg daily and she has done well with this medication  Patient's last upper endoscopy was in the summer 2020  Note was made of Hernandez's esophagus with no dysplasia  Patient had incidental gastric polyps which range in size from 5 to 9 mm but were all hyperplastic on biopsy  Patient without any significant upper GI symptoms presently  On review of records patient does have a history of colon polyps  Mother had colon cancer in her late 76s or early [de-identified]  Last examination patient did have a right-sided serrated adenoma  Most recent examination was 2018  Should be due again for exam in 2023  Patient remains physically active and feels well  She is a breast cancer survivor and gets a mammogram every year  She had been walking quite a bit but has cut back a little bit because of problems with some low back pain  She reports she has had recent lab work without any significant abnormal results  Not available for review at this time-is not exactly sure what lab performed the exams      Historical Information   Past Medical History:   Diagnosis Date   • Hernandez esophagus    • Cancer ([address] )     Breast ca   • Colon polyp    • GERD (gastroesophageal reflux disease)    • Hypertension      Past Surgical History:   Procedure Laterality Date   • APPENDECTOMY     • BREAST SURGERY     • COLONOSCOPY  08/2018   • HERNIA REPAIR      umbilical and inguinal x2   • UPPER GASTROINTESTINAL ENDOSCOPY       Social History     Substance and Sexual Activity   Alcohol Use Yes     Social History     Substance and Sexual Activity   Drug Use Never     Social History     Tobacco Use   Smoking Status Former   Smokeless Tobacco Never     Family History   Problem Relation Age of Onset   • Colon cancer Mother    • Colon polyps Neg Hx          Current Outpatient Medications:   •  lisinopril (ZESTRIL) 2 5 mg tablet  •  omeprazole (PriLOSEC) 40 MG capsule  Allergies   Allergen Reactions   • Adhesive [Medical Tape] Hives   • Bactrim [Sulfamethoxazole-Trimethoprim] Hives   • Cefadroxil Diarrhea   • Flagyl [Metronidazole] Hives     Reviewed medications and allergies and updated as indicated    PHYSICAL EXAM:    Blood pressure 132/65, height 5' 4 5" (1 638 m), weight 75 4 kg (166 lb 3 2 oz)  Body mass index is 28 09 kg/m²  General Appearance: NAD, cooperative, alert  Eyes: Anicteric, conjunctive a pink  ENT:  Normocephalic, atraumatic, normal mucosa  Neck:  Supple, symmetrical, trachea midline  Resp:  Clear to auscultation bilaterally; no rales, rhonchi or wheezing; respirations unlabored   CV:  S1 S2, Regular rate and rhythm; no murmur, rub, or gallop  GI:  Soft, non-tender, non-distended; normal bowel sounds; no masses, no organomegaly   Rectal: Deferred  Musculoskeletal: No cyanosis, clubbing or edema  Normal ROM    Skin:  No jaundice, rashes, or lesions   Heme/Lymph: No palpable cervical lymphadenopathy  Psych: Normal affect, good eye contact  Neuro: No gross deficits, AAOx3

## 2023-04-24 ENCOUNTER — IOP CHECK (OUTPATIENT)
Dept: URBAN - METROPOLITAN AREA CLINIC 79 | Facility: CLINIC | Age: 78
End: 2023-04-24

## 2023-04-24 DIAGNOSIS — H40.1131: ICD-10-CM

## 2023-04-24 PROCEDURE — 92133 CPTRZD OPH DX IMG PST SGM ON: CPT

## 2023-04-24 PROCEDURE — 92012 INTRM OPH EXAM EST PATIENT: CPT

## 2023-04-24 ASSESSMENT — VISUAL ACUITY
OS_CC: 20/25
OD_CC: 20/25

## 2023-04-24 ASSESSMENT — TONOMETRY
OD_IOP_MMHG: 12
OS_IOP_MMHG: 12

## 2023-08-28 ENCOUNTER — ESTABLISHED COMPREHENSIVE EXAM (OUTPATIENT)
Dept: URBAN - METROPOLITAN AREA CLINIC 79 | Facility: CLINIC | Age: 78
End: 2023-08-28

## 2023-08-28 DIAGNOSIS — H31.103: ICD-10-CM

## 2023-08-28 DIAGNOSIS — H40.1131: ICD-10-CM

## 2023-08-28 DIAGNOSIS — H35.372: ICD-10-CM

## 2023-08-28 PROCEDURE — 92014 COMPRE OPH EXAM EST PT 1/>: CPT

## 2023-08-28 PROCEDURE — 92250 FUNDUS PHOTOGRAPHY W/I&R: CPT

## 2023-08-28 ASSESSMENT — VISUAL ACUITY
OD_SC: 20/25+2
OS_CC: 20/25
OS_CC: 20/25-2
OS_SC: 20/30
OD_CC: 20/25
OD_CC: 20/20-2

## 2023-08-28 ASSESSMENT — TONOMETRY
OD_IOP_MMHG: 9
OS_IOP_MMHG: 9

## 2023-10-03 ENCOUNTER — TELEPHONE (OUTPATIENT)
Dept: GASTROENTEROLOGY | Facility: CLINIC | Age: 78
End: 2023-10-03

## 2023-10-03 ENCOUNTER — OFFICE VISIT (OUTPATIENT)
Dept: GASTROENTEROLOGY | Facility: CLINIC | Age: 78
End: 2023-10-03
Payer: MEDICARE

## 2023-10-03 VITALS
WEIGHT: 165.6 LBS | BODY MASS INDEX: 27.59 KG/M2 | SYSTOLIC BLOOD PRESSURE: 128 MMHG | HEIGHT: 65 IN | DIASTOLIC BLOOD PRESSURE: 70 MMHG

## 2023-10-03 DIAGNOSIS — Z80.0 FAMILY HISTORY OF COLON CANCER: ICD-10-CM

## 2023-10-03 DIAGNOSIS — K31.7 GASTRIC POLYPS: ICD-10-CM

## 2023-10-03 DIAGNOSIS — Z86.010 PERSONAL HISTORY OF COLONIC POLYPS: ICD-10-CM

## 2023-10-03 DIAGNOSIS — K22.70 BARRETT'S ESOPHAGUS WITHOUT DYSPLASIA: Primary | ICD-10-CM

## 2023-10-03 DIAGNOSIS — K21.9 GASTROESOPHAGEAL REFLUX DISEASE WITHOUT ESOPHAGITIS: ICD-10-CM

## 2023-10-03 DIAGNOSIS — R10.13 DYSPEPSIA: ICD-10-CM

## 2023-10-03 PROCEDURE — 99213 OFFICE O/P EST LOW 20 MIN: CPT | Performed by: PHYSICIAN ASSISTANT

## 2023-10-03 RX ORDER — OMEPRAZOLE 40 MG/1
40 CAPSULE, DELAYED RELEASE ORAL DAILY
Qty: 90 CAPSULE | Refills: 3 | Status: SHIPPED | OUTPATIENT
Start: 2023-10-03

## 2023-10-03 RX ORDER — BETAMETHASONE DIPROPIONATE 0.5 MG/G
CREAM TOPICAL
COMMUNITY
Start: 2023-06-15

## 2023-10-03 RX ORDER — POLYETHYLENE GLYCOL 3350, SODIUM SULFATE ANHYDROUS, SODIUM BICARBONATE, SODIUM CHLORIDE, POTASSIUM CHLORIDE 236; 22.74; 6.74; 5.86; 2.97 G/4L; G/4L; G/4L; G/4L; G/4L
4000 POWDER, FOR SOLUTION ORAL ONCE
Qty: 4000 ML | Refills: 0 | Status: SHIPPED | OUTPATIENT
Start: 2023-10-03 | End: 2023-10-03

## 2023-10-03 NOTE — PROGRESS NOTES
[facility] - Outpatient Follow-up Note  Willodean Signs 78 y.o. female MRN: [id number]  Encounter: [id number]    ASSESSMENT AND PLAN:    1. Hernandez's esophagus without dysplasia  2. Gastroesophageal reflux disease without esophagitis  3. Dyspepsia  4. Gastric polyps  Reflux controlled with daily PPI. She is due for endoscopy for Hernandez's dysplasia surveillance and also to follow-up on her gastric polyps. I reviewed indications, risk, benefits and alternatives of an endoscopy and she is willing to proceed. An antireflux regimen and lifestyle modifications were reviewed. - omeprazole (PriLOSEC) 40 MG capsule; Take 1 capsule (40 mg total) by mouth daily  Dispense: 90 capsule; Refill: 3  - EGD; Future    5. Personal history of colonic polyps  6. Family history of colon cancer  She is due for colonoscopy given personal history of adenoma and family history of colon cancer in her mother. I reviewed the indications, risk, benefits and alternatives of colonoscopy and she is willing to proceed    - Colonoscopy; Future  - polyethylene glycol (Golytely) 4000 mL solution; Take 4,000 mL by mouth once for 1 dose Take 4000 mL by mouth once for 1 dose. Use as directed  Dispense: 4000 mL; Refill: 0        Follow up appointment: E/C, 1 yr OV  ______________________________________________________________________    Chief Complaint   Patient presents with   • Follow-up     Pt needs to schedule screening EGD/Colonoscopy. Pt states she is doing well, symptoms are under control with medication. HPI:     Patient is a 78 y.o. female with a significant PMH of HTN, breast cancer 2010 being evaluated in follow-up for reflux Hernandez's and history of polyps. Last evaluated Dr. Milton Kehr January 2023 reflux controlled with PPI. Had int diarrhea advised to watch diet. EGD/Colon ordered for summer 2023,  Given history of polyps  FH colon ca (M) colonoscopy ordered.   We will follow-up on gastric polyps at time of EGD. Last EGD August 2020 showing 2 cm of Hernandez's without dysplasia, fundic gland gastric polyps up to 9 mm in size, gastritis neg HP/IM. rec repeat 3 years. Last colonoscopy August 2018 showing diverticulosis, internal hemorrhoids and a small serrated adenoma recommend repeat in 5 years. Patient having formed stools daily without bleeding. She gets rare diarrhea with dietary triggers which she tends to avoid. Eating well and weight stable. Reflux controlled with daily PPI. Denies NSAID use. Pt denies all other GI and constitutional symptoms. ROS:   Constitutional: denies fatigue, fever. HEENT: denies visual disturbance, postnasal drip, sore throat. Respiratory: denies cough, shortness of breath. Cardiovascular: denies chest pain, leg swelling. Gastrointestinal: as noted above in HPI. : denies difficulty urinating, dysuria. Musculoskeletal: denies arthralgias, back pain. Neurological: denies dizziness, syncope. Psychiatric: denies confusion, anxiety.     Historical Information   Past Medical History:   Diagnosis Date   • Hernandez esophagus    • Cancer ([address])     Breast ca   • Colon polyp    • GERD (gastroesophageal reflux disease)    • Hypertension      Past Surgical History:   Procedure Laterality Date   • APPENDECTOMY     • BREAST SURGERY     • COLONOSCOPY  08/2018   • HERNIA REPAIR      umbilical and inguinal x2   • UPPER GASTROINTESTINAL ENDOSCOPY       Social History     Substance and Sexual Activity   Alcohol Use Yes     Social History     Substance and Sexual Activity   Drug Use Never     Social History     Tobacco Use   Smoking Status Former   Smokeless Tobacco Never     Family History   Problem Relation Age of Onset   • Colon cancer Mother    • Colon polyps Neg Hx          Current Outpatient Medications:   •  lisinopril (ZESTRIL) 2.5 mg tablet  •  omeprazole (PriLOSEC) 40 MG capsule  •  polyethylene glycol (Golytely) 4000 mL solution  •  betamethasone dipropionate (DIPROSONE) 0.05 % cream  Allergies   Allergen Reactions   • Bactrim [Sulfamethoxazole-Trimethoprim] Hives   • Cefadroxil Diarrhea   • Flagyl [Metronidazole] Hives   • Sulfamethoxazole Hives   • Latex Rash   • Medical Tape Hives and Rash     Reviewed medications and allergies and updated as indicated    PHYSICAL EXAM:    Blood pressure 128/70, height 5' 4.5" (1.638 m), weight 75.1 kg (165 lb 9.6 oz). Body mass index is 27.99 kg/m². Constitutional: Well-developed, no acute distress  HEENT: normocephalic, mucous membranes moist.  Neck: Supple  Skin: warm and dry  Respiratory: Lungs are clear to auscultation B/L. Cardiovascular: Heart is regular rate and rhythm. Gastrointestinal: Soft, nontender, nondistended with normal active bowel sounds. No masses, guarding, rebound. Rectal Exam: Deferred. Integumentary: Warm and dry  Extremities: No edema. Neurologic: Nonfocal. A & O ×3. Psychiatric: Normal affect. Lab Results:   As above  Radiology Results:   No results found. Patient expressed understanding and had all questions and concerns addressed. Advised patient to call with any questions, failure to improve, or if symptoms worsen. Zain Golden PA-C  10/03/23  1:12 PM    This chart was completed in part utilizing [employer] speech voice recognition software. Random word insertions, pronoun errors, and incomplete sentences are an occasional consequence of this system due to software limitations, and ambient noise. Any questions or concerns about the content, text, or information contained within the body of this dictation should be directly addressed to the provider for clarification.

## 2023-10-03 NOTE — TELEPHONE ENCOUNTER
Scheduled date of colonoscopy (as of today):12/5/23  Physician performing colonoscopy:SANDRA  Location of colonoscopy:[facility]  Bowel prep reviewed with patient:Brandee  Instructions reviewed with patient by:COBY  Clearances: N

## 2023-11-21 ENCOUNTER — ANESTHESIA EVENT (OUTPATIENT)
Dept: ANESTHESIOLOGY | Facility: AMBULATORY SURGERY CENTER | Age: 78
End: 2023-11-21

## 2023-11-21 ENCOUNTER — ANESTHESIA (OUTPATIENT)
Dept: ANESTHESIOLOGY | Facility: AMBULATORY SURGERY CENTER | Age: 78
End: 2023-11-21

## 2023-11-27 ENCOUNTER — TELEPHONE (OUTPATIENT)
Dept: GASTROENTEROLOGY | Facility: CLINIC | Age: 78
End: 2023-11-27

## 2023-12-05 ENCOUNTER — ANESTHESIA (OUTPATIENT)
Dept: GASTROENTEROLOGY | Facility: AMBULATORY SURGERY CENTER | Age: 78
End: 2023-12-05

## 2023-12-05 ENCOUNTER — ANESTHESIA EVENT (OUTPATIENT)
Dept: GASTROENTEROLOGY | Facility: AMBULATORY SURGERY CENTER | Age: 78
End: 2023-12-05

## 2023-12-05 ENCOUNTER — HOSPITAL ENCOUNTER (OUTPATIENT)
Dept: GASTROENTEROLOGY | Facility: AMBULATORY SURGERY CENTER | Age: 78
Discharge: HOME | End: 2023-12-05
Payer: MEDICARE

## 2023-12-05 VITALS
RESPIRATION RATE: 19 BRPM | BODY MASS INDEX: 27.49 KG/M2 | HEIGHT: 64 IN | HEART RATE: 76 BPM | TEMPERATURE: 97.4 F | SYSTOLIC BLOOD PRESSURE: 171 MMHG | OXYGEN SATURATION: 94 % | DIASTOLIC BLOOD PRESSURE: 81 MMHG | WEIGHT: 161 LBS

## 2023-12-05 DIAGNOSIS — Z86.010 PERSONAL HISTORY OF COLONIC POLYPS: ICD-10-CM

## 2023-12-05 DIAGNOSIS — K22.70 BARRETT'S ESOPHAGUS WITHOUT DYSPLASIA: ICD-10-CM

## 2023-12-05 PROCEDURE — 88305 TISSUE EXAM BY PATHOLOGIST: CPT | Performed by: SPECIALIST

## 2023-12-05 PROCEDURE — 45385 COLONOSCOPY W/LESION REMOVAL: CPT | Performed by: INTERNAL MEDICINE

## 2023-12-05 PROCEDURE — 45381 COLONOSCOPY SUBMUCOUS NJX: CPT | Performed by: INTERNAL MEDICINE

## 2023-12-05 PROCEDURE — 43239 EGD BIOPSY SINGLE/MULTIPLE: CPT | Performed by: INTERNAL MEDICINE

## 2023-12-05 RX ORDER — LIDOCAINE HYDROCHLORIDE 10 MG/ML
INJECTION, SOLUTION EPIDURAL; INFILTRATION; INTRACAUDAL; PERINEURAL AS NEEDED
Status: DISCONTINUED | OUTPATIENT
Start: 2023-12-05 | End: 2023-12-05

## 2023-12-05 RX ORDER — PROPOFOL 10 MG/ML
INJECTION, EMULSION INTRAVENOUS AS NEEDED
Status: DISCONTINUED | OUTPATIENT
Start: 2023-12-05 | End: 2023-12-05

## 2023-12-05 RX ORDER — SODIUM CHLORIDE, SODIUM LACTATE, POTASSIUM CHLORIDE, CALCIUM CHLORIDE 600; 310; 30; 20 MG/100ML; MG/100ML; MG/100ML; MG/100ML
50 INJECTION, SOLUTION INTRAVENOUS CONTINUOUS
Status: DISCONTINUED | OUTPATIENT
Start: 2023-12-05 | End: 2023-12-09 | Stop reason: HOSPADM

## 2023-12-05 RX ADMIN — LIDOCAINE HYDROCHLORIDE 50 MG: 10 INJECTION, SOLUTION EPIDURAL; INFILTRATION; INTRACAUDAL; PERINEURAL at 08:03

## 2023-12-05 RX ADMIN — PROPOFOL 50 MG: 10 INJECTION, EMULSION INTRAVENOUS at 08:20

## 2023-12-05 RX ADMIN — PROPOFOL 50 MG: 10 INJECTION, EMULSION INTRAVENOUS at 08:57

## 2023-12-05 RX ADMIN — PROPOFOL 50 MG: 10 INJECTION, EMULSION INTRAVENOUS at 08:11

## 2023-12-05 RX ADMIN — PROPOFOL 50 MG: 10 INJECTION, EMULSION INTRAVENOUS at 08:49

## 2023-12-05 RX ADMIN — PROPOFOL 150 MG: 10 INJECTION, EMULSION INTRAVENOUS at 08:03

## 2023-12-05 RX ADMIN — PROPOFOL 50 MG: 10 INJECTION, EMULSION INTRAVENOUS at 08:30

## 2023-12-05 RX ADMIN — SODIUM CHLORIDE, SODIUM LACTATE, POTASSIUM CHLORIDE, CALCIUM CHLORIDE 50 ML/HR: 600; 310; 30; 20 INJECTION, SOLUTION INTRAVENOUS at 07:58

## 2023-12-05 NOTE — H&P
History and Physical - [facility]  Jaswinder De Leon 78 y.o. female MRN: [id number]    HPI: Jaswinder De Leon is a 78y.o. year old female who presents for EGD and colonoscopy. She has a history of Hernandez's esophagus and multiple gastric polyps. Also history of colon polyps and family history of colon cancer. REVIEW OF SYSTEMS: Per the HPI, and otherwise unremarkable.     Historical Information   Past Medical History:   Diagnosis Date    Hernandez esophagus     Cancer ([address])     Breast ca    Colon polyp     GERD (gastroesophageal reflux disease)     Hypertension      Past Surgical History:   Procedure Laterality Date    APPENDECTOMY      BREAST SURGERY      COLONOSCOPY  08/2018    HERNIA REPAIR      umbilical and inguinal x3    TUBAL LIGATION      UPPER GASTROINTESTINAL ENDOSCOPY       Social History   Social History     Substance and Sexual Activity   Alcohol Use Yes    Comment: socially     Social History     Substance and Sexual Activity   Drug Use Never     Social History     Tobacco Use   Smoking Status Former    Types: Cigarettes   Smokeless Tobacco Never     Family History   Problem Relation Age of Onset    Colon cancer Mother     Colon polyps Neg Hx        Meds/Allergies       Current Outpatient Medications:     lisinopril (ZESTRIL) 2.5 mg tablet    omeprazole (PriLOSEC) 40 MG capsule    betamethasone dipropionate (DIPROSONE) 0.05 % cream    polyethylene glycol (Golytely) 4000 mL solution    Current Facility-Administered Medications:     lactated ringers infusion, 50 mL/hr, Intravenous, Continuous    Allergies   Allergen Reactions    Bactrim [Sulfamethoxazole-Trimethoprim] Hives    Cefadroxil Diarrhea    Flagyl [Metronidazole] Hives    Sulfamethoxazole Hives    Latex Rash    Medical Tape Hives and Rash       Objective     /74   Pulse 80   Temp (!) 97.4 °F (36.3 °C) (Temporal)   Resp 22   Ht 5' 4" (1.626 m)   Wt 73 kg (161 lb)   SpO2 96%   BMI 27.64 kg/m²     PHYSICAL EXAM    Gen: NAD AAOx3  Head: Normocephalic, Atraumatic  CV: S1S2 RRR no m/r/g  CHEST: Clear b/l no c/r/w  ABD: soft, +BS NT/ND  EXT: no edema    ASSESSMENT/PLAN:  This is a 78y.o. year old female here for EGD and colonoscopy and she is stable and optimized for her procedure.

## 2023-12-05 NOTE — ANESTHESIA POSTPROCEDURE EVALUATION
Post-Op Assessment Note    CV Status:  Stable  Pain Score: 0    Pain management: adequate       Mental Status:  Alert and awake   Hydration Status:  Euvolemic   PONV Controlled:  None   Airway Patency:  Patent     Post Op Vitals Reviewed: Yes    No anethesia notable event occurred.     Staff: Anesthesiologist, CRNA   Comments: report given to RN; JOSUÉ; ROBBY              BP   176/63   Temp     Pulse  70   Resp   20   SpO2   100

## 2023-12-05 NOTE — ANESTHESIA PREPROCEDURE EVALUATION
Procedure:  COLONOSCOPY  EGD    Relevant Problems   CARDIO   (+) Essential hypertension      GI/HEPATIC   (+) Gastroesophageal reflux disease      GYN   (+) Breast cancer (HCC)        Physical Exam    Airway    Mallampati score: II  TM Distance: >3 FB  Neck ROM: full     Dental   Comment: None loose, No notable dental hx     Cardiovascular      Pulmonary      Other Findings  post-pubertal.      Anesthesia Plan  ASA Score- 2     Anesthesia Type- IV sedation with anesthesia with ASA Monitors. Additional Monitors:     Airway Plan:     Comment: Last of PO bowel prep:  03:30    Patient educated on the possibility for awareness under sedation and of the possibility of airway intervention in the event of an airway or procedural emergency    Available labwork, imaging, and diagnostic studies relevant to the procedure reviewed including prior anesthetic records. Patient is acceptable for the intended procedure    . Plan Factors-Exercise tolerance (METS): >4 METS. Chart reviewed. Patient summary reviewed. Patient is not a current smoker. Induction- intravenous. Postoperative Plan-     Informed Consent- Anesthetic plan and risks discussed with patient. I personally reviewed this patient with the CRNA. Discussed and agreed on the Anesthesia Plan with the CRNA. Anabelle Culp

## 2023-12-12 PROCEDURE — 88305 TISSUE EXAM BY PATHOLOGIST: CPT | Performed by: SPECIALIST

## 2023-12-14 ENCOUNTER — TELEPHONE (OUTPATIENT)
Dept: GASTROENTEROLOGY | Facility: CLINIC | Age: 78
End: 2023-12-14

## 2023-12-14 NOTE — TELEPHONE ENCOUNTER
I called the patient and left a voice message. Does have Hernandez's esophagus. No recall for the Hernandez's esophagus. Colonoscopy patient did have a 2.4 cm polyp ascending colon. There was some high-grade dysplasia in the polyp. I thought it was completely removed. Area was tattooed. Probably should have a follow-up exam 3 to 4 months. Will arrange.   Discussed with Dr. Alethea Epps about doing it in case she would need to EMR  -Please copy patient's PCP

## 2023-12-18 ENCOUNTER — TELEPHONE (OUTPATIENT)
Dept: GASTROENTEROLOGY | Facility: CLINIC | Age: 78
End: 2023-12-18

## 2023-12-18 NOTE — TELEPHONE ENCOUNTER
Called the patient again this morning and left a voice message.  Would like her to have a follow-up colonoscopy Dr. Arnett in about 3 to 4 months.  Had a focal of high-grade dysplasia on the polyp.  It is in the ascending colon and marked with a tattoo.  Asked her to call back for any questions.  Had left a similar message last week.  Please copy patient's PCP

## 2023-12-20 ENCOUNTER — PREP FOR PROCEDURE (OUTPATIENT)
Dept: GASTROENTEROLOGY | Facility: CLINIC | Age: 78
End: 2023-12-20

## 2023-12-20 DIAGNOSIS — K63.5 POLYP OF COLON, UNSPECIFIED PART OF COLON, UNSPECIFIED TYPE: ICD-10-CM

## 2023-12-20 DIAGNOSIS — Z80.0 FAMILY HISTORY OF COLON CANCER: Primary | ICD-10-CM

## 2023-12-20 NOTE — TELEPHONE ENCOUNTER
Scheduled date of colonoscopy (as of today):04/03/2024   Physician performing colonoscopy: Dr Arnett  Location of colonoscopy:[facility]    Bowel prep reviewed with patient: ?  Patient requested to have Clenpiq and Sutab instructions emailed-she will review and call back with the prep-she is aware that her insurance will probably not cover the medication  Clearances: none    Patient will need to be called to update OA and ASC questions closure to procedure date.

## 2024-01-08 ENCOUNTER — IOP CHECK (OUTPATIENT)
Dept: URBAN - METROPOLITAN AREA CLINIC 79 | Facility: CLINIC | Age: 79
End: 2024-01-08

## 2024-01-08 DIAGNOSIS — H40.1131: ICD-10-CM

## 2024-01-08 PROCEDURE — 92083 EXTENDED VISUAL FIELD XM: CPT

## 2024-01-08 PROCEDURE — 92012 INTRM OPH EXAM EST PATIENT: CPT

## 2024-01-08 PROCEDURE — 92133 CPTRZD OPH DX IMG PST SGM ON: CPT

## 2024-01-08 ASSESSMENT — TONOMETRY
OD_IOP_MMHG: 10
OS_IOP_MMHG: 10

## 2024-01-08 ASSESSMENT — VISUAL ACUITY
OD_CC: 20/20
OS_CC: 20/30

## 2024-03-20 ENCOUNTER — ANESTHESIA (OUTPATIENT)
Dept: ANESTHESIOLOGY | Facility: AMBULATORY SURGERY CENTER | Age: 79
End: 2024-03-20

## 2024-03-20 ENCOUNTER — ANESTHESIA EVENT (OUTPATIENT)
Dept: ANESTHESIOLOGY | Facility: AMBULATORY SURGERY CENTER | Age: 79
End: 2024-03-20

## 2024-03-27 ENCOUNTER — TELEPHONE (OUTPATIENT)
Dept: GASTROENTEROLOGY | Facility: CLINIC | Age: 79
End: 2024-03-27

## 2024-03-27 NOTE — TELEPHONE ENCOUNTER
Procedure confirmed  Colonoscopy     Via: Voice mail    Instructions given: Email     Prep Given: Sutab per pt.    Call the office if there are any questions.

## 2024-04-03 ENCOUNTER — ANESTHESIA (OUTPATIENT)
Dept: GASTROENTEROLOGY | Facility: AMBULATORY SURGERY CENTER | Age: 79
End: 2024-04-03

## 2024-04-03 ENCOUNTER — HOSPITAL ENCOUNTER (OUTPATIENT)
Dept: GASTROENTEROLOGY | Facility: AMBULATORY SURGERY CENTER | Age: 79
Discharge: HOME | End: 2024-04-03
Payer: MEDICARE

## 2024-04-03 ENCOUNTER — ANESTHESIA EVENT (OUTPATIENT)
Dept: GASTROENTEROLOGY | Facility: AMBULATORY SURGERY CENTER | Age: 79
End: 2024-04-03

## 2024-04-03 VITALS
DIASTOLIC BLOOD PRESSURE: 64 MMHG | HEART RATE: 83 BPM | OXYGEN SATURATION: 94 % | BODY MASS INDEX: 26.66 KG/M2 | SYSTOLIC BLOOD PRESSURE: 142 MMHG | HEIGHT: 65 IN | TEMPERATURE: 97 F | WEIGHT: 160 LBS | RESPIRATION RATE: 22 BRPM

## 2024-04-03 DIAGNOSIS — K63.5 POLYP OF ASCENDING COLON, UNSPECIFIED TYPE: ICD-10-CM

## 2024-04-03 PROCEDURE — 45378 DIAGNOSTIC COLONOSCOPY: CPT | Performed by: INTERNAL MEDICINE

## 2024-04-03 RX ORDER — SODIUM CHLORIDE, SODIUM LACTATE, POTASSIUM CHLORIDE, CALCIUM CHLORIDE 600; 310; 30; 20 MG/100ML; MG/100ML; MG/100ML; MG/100ML
50 INJECTION, SOLUTION INTRAVENOUS CONTINUOUS
Status: DISCONTINUED | OUTPATIENT
Start: 2024-04-03 | End: 2024-04-07 | Stop reason: HOSPADM

## 2024-04-03 RX ORDER — SODIUM CHLORIDE, SODIUM LACTATE, POTASSIUM CHLORIDE, CALCIUM CHLORIDE 600; 310; 30; 20 MG/100ML; MG/100ML; MG/100ML; MG/100ML
INJECTION, SOLUTION INTRAVENOUS CONTINUOUS PRN
Status: DISCONTINUED | OUTPATIENT
Start: 2024-04-03 | End: 2024-04-03

## 2024-04-03 RX ORDER — PROPOFOL 10 MG/ML
INJECTION, EMULSION INTRAVENOUS AS NEEDED
Status: DISCONTINUED | OUTPATIENT
Start: 2024-04-03 | End: 2024-04-03

## 2024-04-03 RX ORDER — LIDOCAINE HYDROCHLORIDE 10 MG/ML
INJECTION, SOLUTION EPIDURAL; INFILTRATION; INTRACAUDAL; PERINEURAL AS NEEDED
Status: DISCONTINUED | OUTPATIENT
Start: 2024-04-03 | End: 2024-04-03

## 2024-04-03 RX ADMIN — PROPOFOL 40 MG: 10 INJECTION, EMULSION INTRAVENOUS at 10:53

## 2024-04-03 RX ADMIN — PROPOFOL 20 MG: 10 INJECTION, EMULSION INTRAVENOUS at 10:49

## 2024-04-03 RX ADMIN — PROPOFOL 10 MG: 10 INJECTION, EMULSION INTRAVENOUS at 10:59

## 2024-04-03 RX ADMIN — PROPOFOL 40 MG: 10 INJECTION, EMULSION INTRAVENOUS at 10:55

## 2024-04-03 RX ADMIN — SODIUM CHLORIDE, SODIUM LACTATE, POTASSIUM CHLORIDE, CALCIUM CHLORIDE 50 ML/HR: 600; 310; 30; 20 INJECTION, SOLUTION INTRAVENOUS at 10:02

## 2024-04-03 RX ADMIN — PROPOFOL 50 MG: 10 INJECTION, EMULSION INTRAVENOUS at 10:47

## 2024-04-03 RX ADMIN — SODIUM CHLORIDE, SODIUM LACTATE, POTASSIUM CHLORIDE, CALCIUM CHLORIDE: 600; 310; 30; 20 INJECTION, SOLUTION INTRAVENOUS at 10:35

## 2024-04-03 RX ADMIN — LIDOCAINE HYDROCHLORIDE 50 MG: 10 INJECTION, SOLUTION EPIDURAL; INFILTRATION; INTRACAUDAL; PERINEURAL at 10:46

## 2024-04-03 RX ADMIN — PROPOFOL 40 MG: 10 INJECTION, EMULSION INTRAVENOUS at 10:58

## 2024-04-03 NOTE — ANESTHESIA PREPROCEDURE EVALUATION
Procedure:  COLONOSCOPY    Relevant Problems   CARDIO   (+) Essential hypertension      GI/HEPATIC   (+) Gastroesophageal reflux disease      GYN   (+) Breast cancer (HCC)        Physical Exam    Airway    Mallampati score: II  TM Distance: >3 FB  Neck ROM: full     Dental   Comment: None loose, No notable dental hx     Cardiovascular      Pulmonary      Other Findings  post-pubertal.      Anesthesia Plan  ASA Score- 2     Anesthesia Type- IV sedation with anesthesia with ASA Monitors.         Additional Monitors:     Airway Plan:     Comment: Last of PO bowel prep:  03:30    Patient educated on the possibility for awareness under sedation and of the possibility of airway intervention in the event of an airway or procedural emergency    Available labwork, imaging, and diagnostic studies relevant to the procedure reviewed including prior anesthetic records. Patient is acceptable for the intended procedure    .       Plan Factors-Exercise tolerance (METS): >4 METS.    Chart reviewed.    Patient summary reviewed.    Patient is not a current smoker.              Induction- intravenous.    Postoperative Plan-     Informed Consent- Anesthetic plan and risks discussed with patient.  I personally reviewed this patient with the CRNA. Discussed and agreed on the Anesthesia Plan with the CRNA..

## 2024-04-03 NOTE — H&P
"History and Physical -  [facility]  Paty Gonzalez 78 y.o. female MRN: [id number]    HPI: Paty Gonzalez is a 78 y.o. year old female who presents for personal history of colon polyps    REVIEW OF SYSTEMS: Per the HPI, and otherwise unremarkable.    Historical Information   Past Medical History:   Diagnosis Date    Hernandez esophagus     Cancer (HCC)     Breast ca    Colon polyp     GERD (gastroesophageal reflux disease)     Hypertension      Past Surgical History:   Procedure Laterality Date    APPENDECTOMY      BREAST SURGERY      COLONOSCOPY  08/2018    HERNIA REPAIR      umbilical and inguinal x3    TUBAL LIGATION      UPPER GASTROINTESTINAL ENDOSCOPY       Social History   Social History     Substance and Sexual Activity   Alcohol Use Not Currently     Social History     Substance and Sexual Activity   Drug Use Never     Social History     Tobacco Use   Smoking Status Former    Types: Cigarettes   Smokeless Tobacco Never     Family History   Problem Relation Age of Onset    Colon cancer Mother     Colon polyps Neg Hx        Meds/Allergies       Current Outpatient Medications:     lisinopril (ZESTRIL) 2.5 mg tablet    omeprazole (PriLOSEC) 40 MG capsule    Sodium Sulfate-Mag Sulfate-KCl (Sutab) 7091-206-794 MG TABS    Current Facility-Administered Medications:     lactated ringers infusion, 50 mL/hr, Intravenous, Continuous, 50 mL/hr at 04/03/24 1002    Allergies   Allergen Reactions    Bactrim [Sulfamethoxazole-Trimethoprim] Hives    Cefadroxil Diarrhea    Flagyl [Metronidazole] Hives    Sulfamethoxazole Hives    Latex Rash    Medical Tape Hives and Rash       Objective     /95   Pulse 86   Temp (!) 97 °F (36.1 °C) (Temporal)   Resp 17   Ht 5' 5\" (1.651 m)   Wt 72.6 kg (160 lb)   SpO2 98%   BMI 26.63 kg/m²     PHYSICAL EXAM    Gen: NAD AAOx3  Head: Normocephalic, Atraumatic  CV: S1S2 RRR no m/r/g  CHEST: Clear b/l no c/r/w  ABD: soft, +BS NT/ND  EXT: no " edema    ASSESSMENT/PLAN:  This is a 78 y.o. year old female here for colonoscopy, and she is stable and optimized for her procedure.

## 2024-04-03 NOTE — ANESTHESIA POSTPROCEDURE EVALUATION
Post-Op Assessment Note    CV Status:  Stable  Pain Score: 0    Pain management: adequate       Mental Status:  Sleepy   Hydration Status:  Euvolemic   PONV Controlled:  Controlled   Airway Patency:  Patent     Post Op Vitals Reviewed: Yes    No anethesia notable event occurred.    Staff: ALEA           BP   173/84   Temp      Pulse 77   Resp 16   SpO2 97%ra

## 2024-05-06 ENCOUNTER — IOP CHECK (OUTPATIENT)
Dept: URBAN - METROPOLITAN AREA CLINIC 79 | Facility: CLINIC | Age: 79
End: 2024-05-06

## 2024-05-06 DIAGNOSIS — H40.1131: ICD-10-CM

## 2024-05-06 DIAGNOSIS — H04.123: ICD-10-CM

## 2024-05-06 PROCEDURE — 92012 INTRM OPH EXAM EST PATIENT: CPT

## 2024-05-06 PROCEDURE — 92083 EXTENDED VISUAL FIELD XM: CPT

## 2024-05-06 ASSESSMENT — TONOMETRY
OS_IOP_MMHG: 13
OD_IOP_MMHG: 12

## 2024-05-06 ASSESSMENT — VISUAL ACUITY
OD_CC: 20/20
OS_CC: 20/25

## 2024-08-19 DIAGNOSIS — R10.13 DYSPEPSIA: ICD-10-CM

## 2024-08-20 RX ORDER — OMEPRAZOLE 40 MG/1
40 CAPSULE, DELAYED RELEASE ORAL DAILY
Qty: 100 CAPSULE | Refills: 1 | Status: SHIPPED | OUTPATIENT
Start: 2024-08-20

## 2024-09-09 ENCOUNTER — ESTABLISHED COMPREHENSIVE EXAM (OUTPATIENT)
Dept: URBAN - METROPOLITAN AREA CLINIC 79 | Facility: CLINIC | Age: 79
End: 2024-09-09

## 2024-09-09 DIAGNOSIS — I10: ICD-10-CM

## 2024-09-09 DIAGNOSIS — H40.1131: ICD-10-CM

## 2024-09-09 DIAGNOSIS — H35.372: ICD-10-CM

## 2024-09-09 DIAGNOSIS — H31.103: ICD-10-CM

## 2024-09-09 DIAGNOSIS — H52.4: ICD-10-CM

## 2024-09-09 DIAGNOSIS — H04.123: ICD-10-CM

## 2024-09-09 PROCEDURE — 92015 DETERMINE REFRACTIVE STATE: CPT

## 2024-09-09 PROCEDURE — 92250 FUNDUS PHOTOGRAPHY W/I&R: CPT

## 2024-09-09 PROCEDURE — 92014 COMPRE OPH EXAM EST PT 1/>: CPT

## 2024-09-09 ASSESSMENT — TONOMETRY
OS_IOP_MMHG: 12
OD_IOP_MMHG: 12

## 2024-09-09 ASSESSMENT — VISUAL ACUITY
OD_SC: 20/20
OD_CC: J1+
OD_CC: 20/20
OS_CC: J1+
OS_CC: 20/20-1
OS_SC: 20/40-1

## 2024-09-26 ENCOUNTER — HOSPITAL ENCOUNTER (OUTPATIENT)
Dept: HOSPITAL 99 - WDC | Age: 79
End: 2024-09-26
Payer: MEDICARE

## 2024-09-26 DIAGNOSIS — Z12.31: Primary | ICD-10-CM

## 2024-12-10 ENCOUNTER — OFFICE VISIT (OUTPATIENT)
Dept: GASTROENTEROLOGY | Facility: CLINIC | Age: 79
End: 2024-12-10
Payer: MEDICARE

## 2024-12-10 VITALS
SYSTOLIC BLOOD PRESSURE: 138 MMHG | DIASTOLIC BLOOD PRESSURE: 72 MMHG | BODY MASS INDEX: 26.36 KG/M2 | HEIGHT: 65 IN | WEIGHT: 158.2 LBS

## 2024-12-10 DIAGNOSIS — Z12.11 COLON CANCER SCREENING: ICD-10-CM

## 2024-12-10 DIAGNOSIS — K22.70 BARRETT'S ESOPHAGUS WITHOUT DYSPLASIA: Primary | ICD-10-CM

## 2024-12-10 DIAGNOSIS — D12.6 TUBULAR ADENOMA OF COLON: ICD-10-CM

## 2024-12-10 PROCEDURE — 99214 OFFICE O/P EST MOD 30 MIN: CPT | Performed by: PHYSICIAN ASSISTANT

## 2024-12-10 NOTE — ASSESSMENT & PLAN NOTE
Chronic, stable. Last EGD 12/2023 demonstrated short segment Hernandez's, no dysplasia on bx. No EGD recall. Patient remains asymptomatic.  Self-d/c omeprazole 40 mg QAM due to diarrhea side effects  Recommend diet/lifestyle modifications - handout(s) provided today   Please call the office or schedule an appointment if symptoms worsen or fail to improve.  Consider trial of pantoprazole 20 mg QAM if upper GI sx return

## 2024-12-10 NOTE — PROGRESS NOTES
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 Last EGD 2023 demonstrated short segment Hernandez's, no dysplasia on bx. No EGD recall. Patient remains asymptomatic.  Self-d/c omeprazole 40 mg QAM due to diarrhea side effects  Recommend diet/lifestyle modifications - handout(s) provided today   Please call the office or schedule an appointment if symptoms worsen or fail to improve.  Consider trial of pantoprazole 20 mg QAM if upper GI sx return       Colon cancer screening  Last colonoscopy: 2024  recall: none yr(s)  due: age       Tubular adenoma of colon  Completely excised , no residual on repeat colonoscopy . No colonoscopy recall due to advanced age         Return if symptoms worsen or fail to improve.      History of Present Illness     Chief Complaint   Patient presents with    Follow-up     Pt would like discuss omeprazole.       Accompanied by self and history is obtained from self.    Patient is a 79 y.o. female with a significant PMH of tubular adenoma with focal high-grade dysplasia, short segment Hernandez's esophagus, GERD, HTN, hx/o breast cancer presenting for follow up regarding omeprazole.    Omeprazole  Currently on 40 mg QD per Dr. Gaines for short segment Hernandez's  Has been on this for year  Causes diarrhea, which is very bothersome  Stopped taking it several days ago ? diarrhea much better now  No heartburn/regurgitation    Workup to date:   EGD: 2023, no recall: \"IMPRESSION:  Short segment Hernandez's esophagus  2 cm hiatal hernia sliding  Multiple fundic gland polyps proximal stomach  Normal duodenum\"  Colonoscopy: 4/3/2024, no recall: \"IMPRESSION:  Scattered diverticulosis of moderate severity in the sigmoid colon  Prior " "polypectomy site was clearly identified adjacent to the SPOT tattoo, no residual polyp\"  Recent GI imaging: None    Review of Systems   Constitutional:  Negative for appetite change, chills, fever and unexpected weight change.   HENT:  Negative for sore throat, trouble swallowing and voice change.    Respiratory:  Negative for cough and choking.    Cardiovascular:  Negative for chest pain and leg swelling.   Gastrointestinal:  Negative for abdominal distention, abdominal pain, anal bleeding, blood in stool, constipation, diarrhea, nausea, rectal pain and vomiting.   Skin:  Negative for pallor and rash.   Neurological:  Negative for weakness, light-headedness and headaches.   Psychiatric/Behavioral:  Negative for confusion and sleep disturbance. The patient is not nervous/anxious.        Objective   /72   Ht 5' 5\" (1.651 m)   Wt 71.8 kg (158 lb 3.2 oz)   BMI 26.33 kg/m²     Blood pressure 138/72, height 5' 5\" (1.651 m), weight 71.8 kg (158 lb 3.2 oz). Body mass index is 26.33 kg/m².    Physical Exam  Vitals and nursing note reviewed.   Constitutional:       General: She is not in acute distress.     Appearance: She is not toxic-appearing.   HENT:      Head: Normocephalic.      Mouth/Throat:      Mouth: Mucous membranes are moist.      Pharynx: Oropharynx is clear.   Eyes:      General: No scleral icterus.     Extraocular Movements: Extraocular movements intact.   Neck:      Trachea: Phonation normal.   Cardiovascular:      Comments: Appears well perfused  Pulmonary:      Effort: Pulmonary effort is normal. No respiratory distress.   Musculoskeletal:      Cervical back: Neck supple.      Comments: Moving all 4 extremities spontaneously   Skin:     General: Skin is warm and dry.      Capillary Refill: Capillary refill takes less than 2 seconds.      Coloration: Skin is not jaundiced or pale.   Neurological:      General: No focal deficit present.      Mental Status: She is alert and oriented to person, place, " and time.   Psychiatric:         Behavior: Behavior normal. Behavior is cooperative.         Thought Content: Thought content normal.         I have spent a total time of 25 minutes on 12/10/24 in caring for this patient including Diagnostic results, Prognosis, Risks and benefits of tx options, Instructions for management, Patient and family education, Importance of tx compliance, Risk factor reductions, Impressions, Counseling / Coordination of care, Documenting in the medical record, Reviewing / ordering tests, medicine, procedures  , and Obtaining or reviewing history  .    Patient expressed understanding and had all questions and concerns addressed.    April Baptiste PA-C  12/10/24  12:39 PM    This chart was completed in part utilizing [employer] speech voice recognition software. Random word insertions, pronoun errors, and incomplete sentences are an occasional consequence of this system due to software limitations, and ambient noise. Any questions or concerns about the content, text, or information contained within the body of this dictation should be directly addressed to the provider for clarification.

## 2024-12-10 NOTE — ASSESSMENT & PLAN NOTE
Completely excised 2023, no residual on repeat colonoscopy 2024. No colonoscopy recall due to advanced age

## 2024-12-16 ENCOUNTER — NURSE TRIAGE (OUTPATIENT)
Age: 79
End: 2024-12-16

## 2024-12-16 ENCOUNTER — TELEPHONE (OUTPATIENT)
Age: 79
End: 2024-12-16

## 2024-12-16 DIAGNOSIS — R19.7 ACUTE DIARRHEA: Primary | ICD-10-CM

## 2024-12-16 NOTE — TELEPHONE ENCOUNTER
"LOV 12/10/24      Pt transferred to myself from [facility].    Pt reports she has been having episodes of diarrhea since Thanksgiving. Pt notes some days she feels improvement of symptoms then suddenly the diarrhea returns. Episodes are \"sporadic\". She notes symptoms have worsened since last OV 12/10. She does not consume greasy, fatty, spicy foods. Meat is limited to chicken. She does not consume raw or undercooked foods. She is hydrating appropriately. Pt is having up to 2 episodes of loose/watery diarrhea daily. She takes daily probiotics. Imodium does not provide relief. Denies nausea, vomiting, fever, abdominal pain, hematochezia, weakness, dizziness or any other symptoms. She reports the diarrhea has been negatively impacting her lifestyle. Informed pt I would forward her concerns to the provider and office will return a call with advice/recommendations.    Reason for Disposition   MILD-MODERATE diarrhea (e.g., 1-6 times / day more than normal)    Answer Assessment - Initial Assessment Questions  1. DIARRHEA SEVERITY: \"How bad is the diarrhea?\" \"How many more stools have you had in the past 24 hours than normal?\"       2  2. ONSET: \"When did the diarrhea begin?\"       11/24  3. STOOL DESCRIPTION:  \"How loose or watery is the diarrhea?\" \"What is the stool color?\" \"Is there any blood or mucous in the stool?\"      Loose/watery  4. VOMITING: \"Are you also vomiting?\" If Yes, ask: \"How many times in the past 24 hours?\"       denies  5. ABDOMEN PAIN: \"Are you having any abdomen pain?\" If Yes, ask: \"What does it feel like?\" (e.g., crampy, dull, intermittent, constant)       denies  6. ABDOMEN PAIN SEVERITY: If present, ask: \"How bad is the pain?\"  (e.g., Scale 1-10; mild, moderate, or severe)      denies  7. ORAL INTAKE: If vomiting, \"Have you been able to drink liquids?\" \"How much liquids have you had in the past 24 hours?\"      Hydrating appropriately  8. HYDRATION: \"Any signs of dehydration?\" (e.g., dry mouth [not just " "dry lips], too weak to stand, dizziness, new weight loss) \"When did you last urinate?\"      denies  9. EXPOSURE: \"Have you traveled to a foreign country recently?\" \"Have you been exposed to anyone with diarrhea?\" \"Could you have eaten any food that was spoiled?\"      denies  10. ANTIBIOTIC USE: \"Are you taking antibiotics now or have you taken antibiotics in the past 2 months?\"        Denies   11. OTHER SYMPTOMS: \"Do you have any other symptoms?\" (e.g., fever, blood in stool)        denies  12. PREGNANCY: \"Is there any chance you are pregnant?\" \"When was your last menstrual period?\"        N/a    Protocols used: Diarrhea-Adult-[place]    "

## 2024-12-16 NOTE — TELEPHONE ENCOUNTER
I spoke with Jessica and let her know that [facility]. I helped her locate one in [place] near her home. I explained the lab would give her the collection containers and instructions, and that we will reach out with the results.

## 2024-12-16 NOTE — TELEPHONE ENCOUNTER
Patients GI provider:  Dr. Gaines    Number to return call: [phone]    Reason for call: Pt calling with symptom of ongoing diarrhea. Transferred to triage nurse.    Scheduled procedure/appointment date if applicable: 12/10/24

## 2024-12-20 NOTE — TELEPHONE ENCOUNTER
Pt calling to see if her labs were back yet, I advised they were not but that as soon as they are and April has a chance to go over them we can call her back to advise. She understands, she says she is still being plagued by her diarrhea issues and is hoping for relief or some type soon.

## 2024-12-22 LAB
ADV 40+41 DNA STL QL NAA+NON-PROBE: NOT DETECTED
ASTRO TYP 1-8 RNA STL QL NAA+NON-PROBE: NOT DETECTED
C CAYETANENSIS DNA STL QL NAA+NON-PROBE: NOT DETECTED
C COLI+JEJ+UPSA DNA STL QL NAA+NON-PROBE: NOT DETECTED
C DIF TOX TCDA+TCDB STL QL NAA+NON-PROBE: NOT DETECTED
C DIFF TOX GENS STL QL NAA+PROBE: NEGATIVE
CALPROTECTIN STL-MCNT: 336 UG/G (ref 0–120)
CRYPTOSP DNA STL QL NAA+NON-PROBE: NOT DETECTED
E COLI O157 DNA STL QL NAA+NON-PROBE: NORMAL
E HISTOLYT DNA STL QL NAA+NON-PROBE: NOT DETECTED
EAEC PAA PLAS AGGR+AATA ST NAA+NON-PRB: NOT DETECTED
EC STX1+STX2 GENES STL QL NAA+NON-PROBE: NOT DETECTED
EPEC EAE GENE STL QL NAA+NON-PROBE: NOT DETECTED
ETEC LTA+ST1A+ST1B TOX ST NAA+NON-PROBE: NOT DETECTED
G LAMBLIA AG STL QL IA: NEGATIVE
G LAMBLIA DNA STL QL NAA+NON-PROBE: NOT DETECTED
NOROVIRUS GI+II RNA STL QL NAA+NON-PROBE: NOT DETECTED
P SHIGELLOIDES DNA STL QL NAA+NON-PROBE: NOT DETECTED
RVA RNA STL QL NAA+NON-PROBE: NOT DETECTED
S ENT+BONG DNA STL QL NAA+NON-PROBE: NOT DETECTED
SAPO I+II+IV+V RNA STL QL NAA+NON-PROBE: NOT DETECTED
SHIGELLA SP+EIEC IPAH ST NAA+NON-PROBE: NOT DETECTED
V CHOL+PARA+VUL DNA STL QL NAA+NON-PROBE: NOT DETECTED
V CHOLERAE DNA STL QL NAA+NON-PROBE: NOT DETECTED
Y ENTEROCOL DNA STL QL NAA+NON-PROBE: NOT DETECTED

## 2024-12-23 ENCOUNTER — RESULTS FOLLOW-UP (OUTPATIENT)
Dept: GASTROENTEROLOGY | Facility: CLINIC | Age: 79
End: 2024-12-23

## 2024-12-23 ENCOUNTER — TELEPHONE (OUTPATIENT)
Age: 79
End: 2024-12-23

## 2024-12-23 NOTE — TELEPHONE ENCOUNTER
Patients GI provider:  JAYDA Baptiste    Number to return call: [phone]     Reason for call: Pt calling to discuss her calprotectin, fecal results. Results are finalized.     Scheduled procedure/appointment date if applicable: Apt/procedure N/A

## 2024-12-23 NOTE — TELEPHONE ENCOUNTER
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 Stools are more formed starting today, less diarrhea frequency. Using BRAT diet. Reduced processed foods, sugars. Has been taking fiber supplement, probiotics.    Recommend proceeding with current dietary modifications. Call back if sx worsen or fail to improve. If diarrhea worsens, consider flex sig with bx for microscopic colitis. Patient expressed understanding and had all questions and concerns addressed.

## 2025-01-13 ENCOUNTER — IOP CHECK (OUTPATIENT)
Dept: URBAN - METROPOLITAN AREA CLINIC 79 | Facility: CLINIC | Age: 80
End: 2025-01-13

## 2025-01-13 DIAGNOSIS — H04.123: ICD-10-CM

## 2025-01-13 DIAGNOSIS — H40.1131: ICD-10-CM

## 2025-01-13 PROCEDURE — 92012 INTRM OPH EXAM EST PATIENT: CPT

## 2025-01-13 PROCEDURE — 92133 CPTRZD OPH DX IMG PST SGM ON: CPT

## 2025-01-13 ASSESSMENT — TONOMETRY
OD_IOP_MMHG: 9
OD_IOP_MMHG: 13
OS_IOP_MMHG: 10
OS_IOP_MMHG: 13

## 2025-01-13 ASSESSMENT — VISUAL ACUITY
OD_CC: 20/20
OS_CC: 20/25-2

## 2025-07-21 DIAGNOSIS — K22.70 BARRETT'S ESOPHAGUS WITHOUT DYSPLASIA: ICD-10-CM

## 2025-07-21 DIAGNOSIS — K21.9 GASTROESOPHAGEAL REFLUX DISEASE WITHOUT ESOPHAGITIS: Primary | ICD-10-CM

## 2025-07-21 NOTE — TELEPHONE ENCOUNTER
Refill request from [facility] for omeprazole 20.  Appears patient had stopped med, not on her med list.  Called patient.  She had continued medication and some days may take higher amount.  Patient had been purchasing OTC for a while.  Confirmed she wants refill of omeprazole 20 mg.

## 2025-07-22 RX ORDER — OMEPRAZOLE 20 MG/1
20 CAPSULE, DELAYED RELEASE ORAL DAILY
Qty: 90 CAPSULE | Refills: 1 | Status: SHIPPED | OUTPATIENT
Start: 2025-07-22
